# Patient Record
Sex: FEMALE | Race: OTHER | Employment: PART TIME | URBAN - METROPOLITAN AREA
[De-identification: names, ages, dates, MRNs, and addresses within clinical notes are randomized per-mention and may not be internally consistent; named-entity substitution may affect disease eponyms.]

---

## 2024-09-15 PROBLEM — G93.2 IIH (IDIOPATHIC INTRACRANIAL HYPERTENSION): Status: ACTIVE | Noted: 2024-09-15

## 2024-09-17 NOTE — NURSING NOTE
Called patient to complete consult and go over instructions, patient is scheduled on  9/27/24  for diagnostic lumbar puncture. Verified allergies and no current anticoagulant medication present. Diet, medication instructions (taking own meds prior to test), also went over with patient that as of today with hold any ASA or ASA products till the date of the procedure and need for  was explained. Also went over instructions of location, time and date of procedure, of location and time ambulatory procedure unit. Also reviewed the procedure itself and answered any questions. Explained also post recovery instructions. Patient made aware that she may call if any other questions that may arise to the myself, gave them my contact information.   Information was also sent via e-mail to verified address and via epic my chart, see message below.  Upcoming Radiology appointment at [facility]  Good afternoon Ms. Mccauley,     You are scheduled on 9/27/24 @ 9 am  for diagnostic lumbar puncture.      You are to come @ 7:30 am to [facility] at [address]. Present yourself to Admission services that is located on the Ground floor to the left of the information desk in Building B. Please sign in using the Kiosk (if any issues with your registration, an admission personnel will assist you). Once admitted you will be instructed to go up to the 1st floor - Surgical services (it will be to the left at the main corridor on the 1st floor); they will get you changed for your procedure, update medical information, and draw blood work. A platelet and clotting time are needed the day of the procedure to assure your safety and healing post procedure.     You may eat a light non-greasy breakfast/lunch, drink fluids and take all your medications that are prescribed to you. Please do not take any Aspirin and also be cautious of any over the counter medication please check if Aspirin is one of the  ingredients (Tylenol, Motrin and Aleve are fine to take). You will need to be off aspirin for at least 5 days to have the procedure done if you should take any aspirin.    For this study you will have local anesthetic (Lidocaine)  to the lower back, you will be awake during the study.   If you should need an anti-anxiety medication, please contact your primary doctor and request for such medication. Bring the medication with you so you can take an hour prior to the procedure.      If not on fluid restrictions, please increase your fluid intake 3 days prior to the procedure.    For the procedure you will be on your stomach, we will use an Xray to assist in accessing your cerebral spinal fluid once the area in your lower back is cleaned and you get a local anesthetic. Once the fluid has been collected, we will send them for testing per your ordering provider instructions. A Band-aid will be applied to the site and you will be assisted back onto your stretcher so you may go back to surgical services to be monitored for the allotted time usually 1 hour.     Upon discharge you will need a ride home so please bring a  for this study. If you should use Uber/Lyft you will need to have someone accompany you for your safety.      The night of or the days following you may experience soreness at your lower back and/or  headaches, these are normal and expected. Your discharge instructions will be to rest, hydrate with fluids (caffeine also helps with the headache if present) and take over the counter medication such as Tylenol, Motrin or Aleve.      Please feel free to call if any other questions or concerns should arise.   Jo Shannon RN  [facility] Radiology RN  [address] [place] [id number]  [phone] (Office)  [phone] (Fax)  [email or web address]

## 2024-09-27 ENCOUNTER — NURSE TRIAGE (OUTPATIENT)
Dept: OTHER | Facility: OTHER | Age: 29
End: 2024-09-27

## 2024-09-27 ENCOUNTER — HOSPITAL ENCOUNTER (OUTPATIENT)
Dept: RADIOLOGY | Facility: HOSPITAL | Age: 29
Discharge: HOME | End: 2024-09-27
Payer: COMMERCIAL

## 2024-09-27 VITALS
DIASTOLIC BLOOD PRESSURE: 78 MMHG | RESPIRATION RATE: 16 BRPM | HEIGHT: 64 IN | OXYGEN SATURATION: 100 % | TEMPERATURE: 97.6 F | SYSTOLIC BLOOD PRESSURE: 120 MMHG | HEART RATE: 69 BPM | BODY MASS INDEX: 35.85 KG/M2 | WEIGHT: 210 LBS

## 2024-09-27 DIAGNOSIS — G93.2 IIH (IDIOPATHIC INTRACRANIAL HYPERTENSION): ICD-10-CM

## 2024-09-27 LAB
EXT PREGNANCY TEST URINE: NEGATIVE
EXT. CONTROL: NORMAL
INR PPP: 0.94 (ref 0.85–1.19)
PLATELET # BLD AUTO: 295 THOUSANDS/UL (ref 149–390)
PMV BLD AUTO: 10 FL (ref 8.9–12.7)
PROTHROMBIN TIME: 12.9 SECONDS (ref 12.3–15)

## 2024-09-27 PROCEDURE — 81025 URINE PREGNANCY TEST: CPT

## 2024-09-27 PROCEDURE — 85610 PROTHROMBIN TIME: CPT

## 2024-09-27 PROCEDURE — 62328 DX LMBR SPI PNXR W/FLUOR/CT: CPT

## 2024-09-27 PROCEDURE — 85049 AUTOMATED PLATELET COUNT: CPT

## 2024-09-27 NOTE — TELEPHONE ENCOUNTER
Per on-call provider, patient may take Imitrex or Tylenol, but if pain is severe, may need to go to the Emergency Department for a blood patch.

## 2024-09-27 NOTE — TELEPHONE ENCOUNTER
"Regarding: post spinal tap/head pain/neck pain  ----- Message from Phyllis SAMPSON sent at 9/27/2024  7:04 PM EDT -----  Pt stated, \"I was just in for a spinal tap. I am having pain in the left side of my head. The back of my neck also hurts. I am confused on what medication I can take.\"    "

## 2024-09-27 NOTE — TELEPHONE ENCOUNTER
"Reason for Disposition  • Patient sounds very sick or weak to the triager    Answer Assessment - Initial Assessment Questions  1. LOCATION: \"Where does it hurt?\"       Left posterior skull, close to neck; throbbing pain    2. ONSET: \"When did the headache start?\" (e.g., minutes, hours, days)       Took Tylenol around 12/1pm without relief    3. PATTERN: \"Does the pain come and go, or has it been constant since it started?\"      Constant  4. SEVERITY: \"How bad is the pain?\" and \"What does it keep you from doing?\"  (e.g., Scale 1-10; mild, moderate, or severe)     Patient states she has been laying down since she got home; pain is more severe upon getting up.     5. RECURRENT SYMPTOM: \"Have you ever had headaches before?\" If Yes, ask: \"When was the last time?\" and \"What happened that time?\"       Denies  6. CAUSE: \"What do you think is causing the headache?\"      Lumbar puncture    7. MIGRAINE: \"Have you been diagnosed with migraine headaches?\" If Yes, ask: \"Is this headache similar?\"       Yes    8. HEAD INJURY: \"Has there been any recent injury to the head?\"       Denies    9. OTHER SYMPTOMS: \"Do you have any other symptoms?\" (e.g., fever, stiff neck, eye pain, sore throat, cold symptoms)      Dizziness    10. PREGNANCY: \"Is there any chance you are pregnant?\" \"When was your last menstrual period?\"        Denies; patient uses Depo    Patient has Sumitriptan and isn't sure if she can take it.    Protocols used: Headache-Adult-    "

## 2024-09-27 NOTE — DISCHARGE INSTRUCTIONS
Lumbar Puncture     WHAT YOU NEED TO KNOW:   Lumbar puncture (LP) is a procedure in which a needle is inserted in your back and into your spinal canal. This is usually done to collect cerebrospinal fluid (CSF) to check for an infection, inflammation, bleeding, or other conditions that affect the brain. CSF is a clear, protective fluid that flows around the brain and inside the spinal canal. LP may also be done to remove CSF to reduce pressure in the brain.     DISCHARGE INSTRUCTIONS:     Follow up with your healthcare provider as directed: Write down your questions so you remember to ask them during your visits.     Post-lumbar puncture headache: You may develop a headache during the first few hours after your LP that may last for several days. The headache may be mild to severe and may get worse when you sit or stand. The following may help ease a post-lumbar puncture headache:  Drink plenty of liquids: You should drink more liquid than usual after your LP. Ask how much liquid is right for you. Caffeine may be used to treat a headache. Drinks, such as coffee, tea, or some sodas, have caffeine. Ask a Do not drink alcohol.    Lie down: If you have a headache after your lumbar puncture, it may be helpful to lie down and rest.  You may have a slight soreness over the LP area. This is normal.  Remove the band aid or dressing in 24 hours.    Contact Radiology immediately if any of the following occur:  You have a severe headache that does not get better after you lie down.  Persistent nausea or vomiting   You have a fever.   You have a stiff neck or have trouble thinking clearly.   Your legs, feet, or other parts below the waist feel numb, tingly, or weak.   You have bleeding or a discharge coming from the area where the needle was put into your back.   You have severe pain in your back or neck.    If Radiology is not available please go the nearest Emergency room for evaluation.     [facility] Radiology Contact  Information      [phone]  [facility]- [phone]  [facility]- [phone]  [facility]- [phone]   [facility]- [phone]  [facility]- [phone]  [facility]- [phone]  [facility]- [phone]

## 2024-09-28 ENCOUNTER — HOSPITAL ENCOUNTER (EMERGENCY)
Facility: HOSPITAL | Age: 29
Discharge: HOME | End: 2024-09-28
Attending: EMERGENCY MEDICINE
Payer: COMMERCIAL

## 2024-09-28 ENCOUNTER — NURSE TRIAGE (OUTPATIENT)
Dept: OTHER | Facility: OTHER | Age: 29
End: 2024-09-28

## 2024-09-28 ENCOUNTER — APPOINTMENT (EMERGENCY)
Dept: CT IMAGING | Facility: HOSPITAL | Age: 29
End: 2024-09-28
Payer: COMMERCIAL

## 2024-09-28 VITALS
WEIGHT: 212.74 LBS | DIASTOLIC BLOOD PRESSURE: 69 MMHG | BODY MASS INDEX: 36.52 KG/M2 | SYSTOLIC BLOOD PRESSURE: 105 MMHG | HEART RATE: 86 BPM | RESPIRATION RATE: 18 BRPM | OXYGEN SATURATION: 100 % | TEMPERATURE: 97.6 F

## 2024-09-28 DIAGNOSIS — G97.1 POST LUMBAR PUNCTURE HEADACHE: Primary | ICD-10-CM

## 2024-09-28 PROCEDURE — 70450 CT HEAD/BRAIN W/O DYE: CPT

## 2024-09-28 PROCEDURE — 99283 EMERGENCY DEPT VISIT LOW MDM: CPT

## 2024-09-28 PROCEDURE — 96375 TX/PRO/DX INJ NEW DRUG ADDON: CPT

## 2024-09-28 PROCEDURE — 96366 THER/PROPH/DIAG IV INF ADDON: CPT

## 2024-09-28 PROCEDURE — 96365 THER/PROPH/DIAG IV INF INIT: CPT

## 2024-09-28 PROCEDURE — 99284 EMERGENCY DEPT VISIT MOD MDM: CPT | Performed by: EMERGENCY MEDICINE

## 2024-09-28 RX ORDER — KETOROLAC TROMETHAMINE 30 MG/ML
15 INJECTION, SOLUTION INTRAMUSCULAR; INTRAVENOUS ONCE
Status: COMPLETED | OUTPATIENT
Start: 2024-09-28 | End: 2024-09-28

## 2024-09-28 RX ORDER — METOCLOPRAMIDE HYDROCHLORIDE 5 MG/ML
10 INJECTION INTRAMUSCULAR; INTRAVENOUS ONCE
Status: COMPLETED | OUTPATIENT
Start: 2024-09-28 | End: 2024-09-28

## 2024-09-28 RX ORDER — DIPHENHYDRAMINE HYDROCHLORIDE 50 MG/ML
25 INJECTION INTRAMUSCULAR; INTRAVENOUS ONCE
Status: COMPLETED | OUTPATIENT
Start: 2024-09-28 | End: 2024-09-28

## 2024-09-28 RX ORDER — MAGNESIUM SULFATE HEPTAHYDRATE 40 MG/ML
2 INJECTION, SOLUTION INTRAVENOUS ONCE
Status: COMPLETED | OUTPATIENT
Start: 2024-09-28 | End: 2024-09-28

## 2024-09-28 RX ADMIN — METOCLOPRAMIDE 10 MG: 5 INJECTION, SOLUTION INTRAMUSCULAR; INTRAVENOUS at 16:32

## 2024-09-28 RX ADMIN — MAGNESIUM SULFATE HEPTAHYDRATE 2 G: 40 INJECTION, SOLUTION INTRAVENOUS at 16:46

## 2024-09-28 RX ADMIN — KETOROLAC TROMETHAMINE 15 MG: 30 INJECTION, SOLUTION INTRAMUSCULAR at 16:32

## 2024-09-28 RX ADMIN — DIPHENHYDRAMINE HYDROCHLORIDE 25 MG: 50 INJECTION, SOLUTION INTRAMUSCULAR; INTRAVENOUS at 16:33

## 2024-09-28 RX ADMIN — CAFFEINE AND SODIUM BENZOATE 500 MG: 125 INJECTION, SOLUTION INTRAMUSCULAR; INTRAVENOUS at 17:54

## 2024-09-28 RX ADMIN — SODIUM CHLORIDE 1000 ML: 0.9 INJECTION, SOLUTION INTRAVENOUS at 16:32

## 2024-09-28 NOTE — TELEPHONE ENCOUNTER
"Reason for Disposition  • Stiff neck (can't touch chin to chest)    Answer Assessment - Initial Assessment Questions  1. LOCATION: \"Where does it hurt?\"       Whole head       But feels pressure on the left side to her spine    2. ONSET: \"When did the headache start?\" (e.g., minutes, hours, days)       Yesterday after spinal tap     3. PATTERN: \"Does the pain come and go, or has it been constant since it started?\"      Constant since it started this AM     4. SEVERITY: \"How bad is the pain?\" and \"What does it keep you from doing?\"  (e.g., Scale 1-10; mild, moderate, or severe)      10/10    5. RECURRENT SYMPTOM: \"Have you ever had headaches before?\" If Yes, ask: \"When was the last time?\" and \"What happened that time?\"       Yes but this doesn't feel like normal headache/migraine     6. CAUSE: \"What do you think is causing the headache?\"      Recently had spinal tap done- 9/27    7. MIGRAINE: \"Have you been diagnosed with migraine headaches?\" If Yes, ask: \"Is this headache similar?\"       Yes       Takes Imitrex for migraines- yesterday it worked but today not working     8. HEAD INJURY: \"Has there been any recent injury to the head?\"       Denies     9. OTHER SYMPTOMS: \"Do you have any other symptoms?\" (e.g., fever, stiff neck, eye pain, sore throat, cold symptoms)      Blurry vision       Dizziness with standing up       Nausea       Neck stiffness, has trouble moving her neck     10. PREGNANCY: \"Is there any chance you are pregnant?\" \"When was your last menstrual period?\"        Denies    Protocols used: Headache-Adult-AH    "

## 2024-09-28 NOTE — TELEPHONE ENCOUNTER
Patient calling in with a worsening position headache. Stated she had a headache yesterday that did improve after taking her prescribed imitrex but today the medication is not helping. Stated she has now also developed dizziness, blurry vision, nausea and neck stiffness. Patient's current pain level is a 10/10. Informed the patient that she should proceed to the nearest ED at this time for evaluation per provider's recommendation yesterday. Patient verbalized understanding, stated she is able to get a ride to the [facility] now. On call provider made aware.

## 2024-09-28 NOTE — TELEPHONE ENCOUNTER
"Regarding: spinal tap/headache wont go away/blurry vision  ----- Message from Gemma SAMPSON sent at 9/28/2024  2:08 PM EDT -----  \"I had a spinal tap done and I am getting a headache that wont go away. I am also experiencing blurry vision\"    "

## 2024-09-29 NOTE — ED PROVIDER NOTES
Final diagnoses:   Post lumbar puncture headache     ED Disposition       ED Disposition   Discharge    Condition   Stable    Date/Time   Sat Sep 28, 2024  8:45 PM    Comment   Caprice Salomon discharge to home/self care.                   Assessment & Plan       Medical Decision Making  29-year-old female with closed LP headache will give migraine cocktail including caffeine, check CT head, reassess.    Amount and/or Complexity of Data Reviewed  Radiology: ordered.    Risk  Prescription drug management.             Medications   sodium chloride 0.9 % bolus 1,000 mL (0 mL Intravenous Stopped 9/28/24 2049)   metoclopramide (REGLAN) injection 10 mg (10 mg Intravenous Given 9/28/24 1632)   diphenhydrAMINE (BENADRYL) injection 25 mg (25 mg Intravenous Given 9/28/24 1633)   ketorolac (TORADOL) injection 15 mg (15 mg Intravenous Given 9/28/24 1632)   magnesium sulfate 2 g/50 mL IVPB (premix) 2 g (0 g Intravenous Stopped 9/28/24 2050)   caffeine-sodium benzoate 500 mg in sodium chloride 0.9 % 1,000 mL IVPB (0 mg Intravenous Stopped 9/28/24 2049)       ED Risk Strat Scores                           SBIRT 22yo+      Flowsheet Row Most Recent Value   Initial Alcohol Screen: US AUDIT-C     1. How often do you have a drink containing alcohol? 0 Filed at: 09/28/2024 1515   2. How many drinks containing alcohol do you have on a typical day you are drinking?  0 Filed at: 09/28/2024 1515   3a. Male UNDER 65: How often do you have five or more drinks on one occasion? 0 Filed at: 09/28/2024 1515   3b. FEMALE Any Age, or MALE 65+: How often do you have 4 or more drinks on one occassion? 0 Filed at: 09/28/2024 1515   Audit-C Score 0 Filed at: 09/28/2024 1515   RACQUEL: How many times in the past year have you...    Used an illegal drug or used a prescription medication for non-medical reasons? Never Filed at: 09/28/2024 1515                            History of Present Illness       Chief Complaint   Patient presents with    Post-op Problem      Pt had spinal tap yesterday for worsening migraines, pt reports headache since that has worsened today with blurred vision. Took rescue migraine meds w/o relief.        Past Medical History:   Diagnosis Date    GERD (gastroesophageal reflux disease)     Left knee pain     arthroscopy today 10/3/2023    Migraine     Migraines     Wears glasses       Past Surgical History:   Procedure Laterality Date    FL LUMBAR PUNCTURE DIAGNOSTIC  9/27/2024    OVARIAN CYST REMOVAL      HI ARTHROSCOPY KNEE REMOVAL LOOSE/FOREIGN BODY Left 10/3/2023    Procedure: ARTHROSCOPY KNEE, loose body removal;  Surgeon: Zeus Harrell MD;  Location: [facility] OR;  Service: Orthopedics    TUBAL LIGATION        Family History   Problem Relation Age of Onset    Diabetes Father     Leukemia Brother     Diabetes Maternal Grandmother     Cancer Maternal Grandfather     Lung cancer Maternal Grandfather     Breast cancer Maternal Aunt       Social History     Tobacco Use    Smoking status: Never    Smokeless tobacco: Never   Vaping Use    Vaping status: Never Used   Substance Use Topics    Alcohol use: No    Drug use: No      E-Cigarette/Vaping    E-Cigarette Use Never User       E-Cigarette/Vaping Substances    Nicotine No     THC No     CBD No     Flavoring No     Other No     Unknown No       I have reviewed and agree with the history as documented.     21-year-old female presents emergency department for evaluation of headache.  Patient recently had LP for treatment of IIH, since then has had exacerbation of her headache which she describes as overall pressure, with worse when she changes position associated with some pressure behind the eyes and blurry vision, she has had blurry vision from previous headaches, she has had no fevers or chills no neck pain or stiffness she has had no numbness weakness tingling         Review of Systems   Constitutional:  Negative for appetite change, chills, fatigue and fever.   HENT:  Negative for sneezing  and sore throat.    Eyes:  Negative for visual disturbance.   Respiratory:  Negative for cough, choking, chest tightness, shortness of breath and wheezing.    Cardiovascular:  Negative for chest pain and palpitations.   Gastrointestinal:  Negative for abdominal pain, constipation, diarrhea, nausea and vomiting.   Genitourinary:  Negative for difficulty urinating and dysuria.   Neurological:  Positive for headaches. Negative for dizziness, weakness, light-headedness and numbness.   All other systems reviewed and are negative.          Objective       ED Triage Vitals   Temperature Pulse Blood Pressure Respirations SpO2 Patient Position - Orthostatic VS   09/28/24 1513 09/28/24 1513 09/28/24 1513 09/28/24 1513 09/28/24 1513 09/28/24 1513   97.6 °F (36.4 °C) 96 121/75 16 100 % Sitting      Temp Source Heart Rate Source BP Location FiO2 (%) Pain Score    09/28/24 1513 09/28/24 1513 09/28/24 1513 -- 09/28/24 1515    Temporal Monitor Left arm  10 - Worst Possible Pain      Vitals      Date and Time Temp Pulse SpO2 Resp BP Pain Score FACES Pain Rating User   09/28/24 2050 -- 86 100 % 18 105/69 -- -- FS   09/28/24 1515 -- -- -- -- -- 10 - Worst Possible Pain -- VB   09/28/24 1513 97.6 °F (36.4 °C) 96 100 % 16 121/75 -- -- TOD            Physical Exam  Vitals and nursing note reviewed.   Constitutional:       General: She is not in acute distress.     Appearance: She is well-developed. She is not diaphoretic.   HENT:      Head: Normocephalic and atraumatic.   Eyes:      Pupils: Pupils are equal, round, and reactive to light.   Neck:      Vascular: No JVD.      Trachea: No tracheal deviation.   Cardiovascular:      Rate and Rhythm: Normal rate and regular rhythm.      Heart sounds: Normal heart sounds. No murmur heard.     No friction rub. No gallop.   Pulmonary:      Effort: Pulmonary effort is normal. No respiratory distress.      Breath sounds: Normal breath sounds. No wheezing or rales.   Abdominal:      General: Bowel  sounds are normal. There is no distension.      Palpations: Abdomen is soft.      Tenderness: There is no abdominal tenderness. There is no guarding or rebound.   Skin:     General: Skin is warm and dry.      Coloration: Skin is not pale.   Neurological:      Mental Status: She is alert and oriented to person, place, and time.      Cranial Nerves: No cranial nerve deficit.      Motor: No abnormal muscle tone.   Psychiatric:         Behavior: Behavior normal.         Results Reviewed       None            CT head without contrast   Final Interpretation by Anjel Taylor MD (09/28 1746)      No acute intracranial hemorrhage, mass effect or midline shift.                  Workstation performed: [id number]             Procedures    ED Medication and Procedure Management   Prior to Admission Medications   Prescriptions Last Dose Informant Patient Reported? Taking?   Magnesium Gluconate (MAGNESIUM 27 PO)   Yes No   Sig: Take by mouth   SUMAtriptan (IMITREX) 50 mg tablet   No No   Sig: Take 1 tablet (50 mg total) by mouth if needed for migraine May repeat 1 tablet in 2 hours if first dose not effective. No more than 3 times per week.   acetaminophen (TYLENOL) 500 mg tablet   No No   Sig: Take 2 tablets (1,000 mg total) by mouth every 8 (eight) hours   medroxyPROGESTERone (DEPO-PROVERA) 150 mg/mL injection   Yes No   Sig: Inject 150 mg into a muscle every 3 (three) months   meloxicam (MOBIC) 7.5 mg tablet   Yes No   Sig: Take 7.5 mg by mouth   rimegepant sulfate (Nurtec) 75 mg TBDP   No No   Sig: Take 1 tablet (75 mg total) by mouth every other day      Facility-Administered Medications: None     Discharge Medication List as of 9/28/2024  8:46 PM        CONTINUE these medications which have NOT CHANGED    Details   acetaminophen (TYLENOL) 500 mg tablet Take 2 tablets (1,000 mg total) by mouth every 8 (eight) hours, Starting Thu 8/24/2023, Normal      Magnesium Gluconate (MAGNESIUM 27 PO) Take by mouth, Historical Med       medroxyPROGESTERone (DEPO-PROVERA) 150 mg/mL injection Inject 150 mg into a muscle every 3 (three) months, Historical Med      meloxicam (MOBIC) 7.5 mg tablet Take 7.5 mg by mouth, Starting Fri 11/3/2023, Historical Med      rimegepant sulfate (Nurtec) 75 mg TBDP Take 1 tablet (75 mg total) by mouth every other day, Starting Mon 9/16/2024, Normal      SUMAtriptan (IMITREX) 50 mg tablet Take 1 tablet (50 mg total) by mouth if needed for migraine May repeat 1 tablet in 2 hours if first dose not effective. No more than 3 times per week., Starting Tue 7/2/2024, Normal           No discharge procedures on file.  ED SEPSIS DOCUMENTATION   Time reflects when diagnosis was documented in both MDM as applicable and the Disposition within this note       Time User Action Codes Description Comment    9/28/2024  8:45 PM Armando Puckett Add [G97.1] Post lumbar puncture headache                  Armando Puckett MD  09/29/24 0334

## 2024-09-30 ENCOUNTER — NURSE TRIAGE (OUTPATIENT)
Age: 29
End: 2024-09-30

## 2024-09-30 DIAGNOSIS — G97.1 POST LUMBAR PUNCTURE HEADACHE: Primary | ICD-10-CM

## 2024-09-30 NOTE — TELEPHONE ENCOUNTER
I would recommend conservative treatment with pushing fluids (water, Gatorade, Pedialyte etc.) 60-80 oz  Also consider adding caffeine which may help with headache. Can consider caffeinated beverages such as coffee vs soda. Another option is  Excedrin migraine which contains caffeine.   Would recommend trying to lay flat as much as possible.   If no improvement despite doing this today, then would recommend blood patch.

## 2024-09-30 NOTE — TELEPHONE ENCOUNTER
JENNI Alcantara      I would recommend conservative treatment with pushing fluids (water, Gatorade, Pedialyte etc.) 60-80 oz  Also consider adding caffeine which may help with headache. Can consider caffeinated beverages such as coffee vs soda. Another option is  Excedrin migraine which contains caffeine.   Would recommend trying to lay flat as much as possible.   If no improvement despite doing this today, then would recommend blood patch.         Perla ARTEAGA also advised via Epic Secure Chat msg, that She can let us know and we can try to arrange outpatient  Blood Patch, to avoid the ER     _________________________________________    I spoke to patient and read her Perla's JENNI recommendations above. Pt understood and aware to provide update to our office tomorrow. Prior to ending call and asking if she had any other questions, the phone disconnected. I called patient back and left a detailed msg per recommendations above and informed patient if she has any further questions to call our office back.

## 2024-09-30 NOTE — TELEPHONE ENCOUNTER
ED provider told patient on Saturday 9/28/24, he did not feel comfortable doing a blood patch, due to her blurry vision (which pt typically gets with her migraines). Patient informed she does get blurry vision when she goes to stand up and move, which is typical with her migraines and not new since LP. Patient wants recommendation from provider as pt was seen at [facility] for initiall LP and had a bad experience. Patient tried all ED provider's recommendation since being d/c, tried her current migraine meds, drank caffeine and lots of water and nothing has helped so far.  Patient informed she has kids to take care of and her  has needed to help her more and will be calling PCP to see if they can give him a work note, to miss a few days to help care for his wife and kids during this time, as patient is limited and unsure what our provider will advise next due to her symptoms.    Patient does not want a blood patch/go to ED,  as she is concerned as she had a bad experience with LP, unless provider advises that is the only way to correct her migraine and needs to go to ED. Patient requesting if provider can advise if there is any other medications or other options to treat this LP headache or if Blood patch is the only way to correct it? If provider advises blood patch, pt would like to know which location she should go to as she is nervous to have to go back to \[facility]\"" . I did listen to patient and to her concerns, and advised that I would advise her to go to ED per triage symptoms. However, I hear and understand her, that she wants provider to advise before she does anything else.     I Routed high priority to provider and also sent an EPIC secure chat to provider as high priority to please advise recommendations per pt request, due to her recent LP on 9/27/24 and ER experience on Saturday 9/28/24. Patient aware once we receive update from provider we will give her a call back at , may  "leave a detailed msg per communication consent.       Reason for Disposition   SEVERE headache, states 'worst headache' of life     When she goes to stand or doing things, the migraine intensifys, if laying down her migraine is a 1/10. Due to LP headache. Patient will not go to ED unless provider advises and  provides recommendations.    Answer Assessment - Initial Assessment Questions  1. LOCATION: \"Where does it hurt?\"       Left side lower portion of the occipital area of the head and rushes to the entire area of the head  2. ONSET: \"When did the headache start?\" (Minutes, hours or days)       Started right afterwards of the LP she had on 9/27/24. Patient had intial headache immediately from turning onto the back and had headache, it already started right after LP. She had headache prior to sitting up. They advised pt they could not give any medications during onset of headache after LP. They told patient to go home and take Tylenol. Patient advised when she called into office that night, they advised her to take migraine med that night and it worked. BUt then she woke up in am, the headache came back. During that time she got intense pressure.She informed pressure got so intense she did go back to ED 9/28/24. That is when she was given the migraine cocktail, CT head and caffeine and mag at hospital. Patient has been sleeping a lot more since Sat after being D/c. She was only in ED for 6 hours. Patient informed because she was lying down for ED stay, her headache wasn't there any longer so she went home. But right when she was standing and started walking, when her headache came back but when she was almost home, her headache was intense. But since the ED provider told her to take caffeine, drink lots of water and rest, that is what she did since d/c. But the headache since then has not gone away. Patient has to lay flat on back to get any slight relief. But if she moves to the side, or try to sit up, she has " "intense pressure and rush of headache.  3. PATTERN: \"Does the pain come and go, or has it been constant since it started?\"      Constant since LP 9/27.  4. SEVERITY: \"How bad is the pain?\" and \"What does it keep you from doing?\"  (e.g., Scale 1-10; mild, moderate, or severe)    - MILD (1-3): doesn't interfere with normal activities     - MODERATE (4-7): interferes with normal activities or awakens from sleep     - SEVERE (8-10): excruciating pain, unable to do any normal activities         If laying down flat 1/10. If going to the side 3-4/10, but sitting up or go to bathroom, or take care of kids and has to get up slowly, and the intense rush of pain goes right to a 10/10. Worst migraine of her life, if she goes to sit up or stand up to do anything.   5. RECURRENT SYMPTOM: \"Have you ever had headaches before?\" If Yes, ask: \"When was the last time?\" and \"What happened that time?\"       Yes, had migraine in June/July kept patient out of work for 2 weeks due to vomiting. But this headache is much more intense due to moving it goes to a 10.10 with throbbing pain  6. CAUSE: \"What do you think is causing the headache?\"      LP Headache  caused from LP being done 9/27/24  7. MIGRAINE: \"Have you been diagnosed with migraine headaches?\" If Yes, ask: \"Is this headache similar?\"       Yes , diff as this headache is much more intense when needing to move or sit or standing up  8. HEAD INJURY: \"Has there been any recent injury to the head?\"       No  9. OTHER SYMPTOMS: \"Do you have any other symptoms?\" (fever, stiff neck, eye pain, sore throat, cold symptoms)      Eye pain from pressure since LP. Stiff neck as her typical migraine head that occurs lower portion of back of her head and then radiates to entire head, but this migraine is much worse and intense since LP. Patient is able to put chin towards chest without any difficulty. Denies Sore throat or cold symptoms. When moving eyes side to side there is so much pressure in " "her eye are, like if her eyes want to come out.  10. PREGNANCY: \"Is there any chance you are pregnant?\" \"When was your last menstrual period?\"        Denies pregnancy,. LMP 6 months ago, pt is on depo shot.    Protocols used: Headache-ADULT-[place]    "

## 2024-10-02 NOTE — TELEPHONE ENCOUNTER
Spoke w/pt. She does admit to some improvement. She is no longer in excruciating pain. She is following all recommendations. Takes Excedrin Migraine q8 hrs. It typically wears off after 6-7 hrs.  Pt is able to sit-up/stand/walk with medicine. She was able to drive daughter to school yesterday; however, was in considerable pain when she got home from sitting in car for extended period of time. She keeps legs elevated and is wearing compression socks and uses leg compression machine q5hrs or so.   She admits to pain/pressure w/eye movement. States this was present prior to LP but has increased s/p LP. The pain does resolve when taking Excedrin Migraine. Denies any blurry vision.    No issues w/bowel or bladder.    Pt would like to see how she is Friday since she is noticing improvement in symptoms.     Luis Carlos riggins

## 2024-10-04 NOTE — TELEPHONE ENCOUNTER
Outbound call placed to patient.    Patient stated that she has been laying down all day and stated that headaches aren't as bad. Just worsen if pt coughs, laughs, or gets up.     Pt stated that she will wait to see how she feels tomorrow. Stated if symptoms continue, she will go to the ED. Pt aware if she does intend to go to the ED, to hold off on taking the Excedrin due to the ASA. Pt verbalized understanding and had no further questions at this time.    Jonathon ALCALA    The history is provided by the patient and medical records. 38 male present emergency department with abdominal pain. Patient states been going on for 1 week. He states that location abdominal pain is epigastric and no worsening with any foods he states. Been intermittent in nature. Does have associated diarrhea. As well as having associated nausea and vomiting. He still having the vomiting and diarrhea as well. He states that it was recently more concerning to him because he had 2 episodes of bright red blood in his stool. Describes there is bright red blood in the toilet as well as when he wiped. He states he thinks he has a history of hemorrhoids in the past.  He otherwise denies any other acute complaints. Denies any changes to urinary habits. Denies any chest pain shortness of breath fevers chills, just redness. The patient presents with abd apin that has been going on for 1 week. These symptoms are moderate in severity. Symptoms are made better by nothign. Symptoms are made worse by nothing. Associated symptoms include nausea, vomiting diarrhea. Review of Systems   Constitutional: Negative for chills and fever. HENT: Negative for ear pain, sinus pressure and sore throat. Eyes: Negative for pain, discharge and redness. Respiratory: Negative for cough, shortness of breath and wheezing. Cardiovascular: Negative for chest pain. Gastrointestinal: Positive for abdominal pain, diarrhea, nausea and vomiting. Genitourinary: Negative for dysuria and frequency. Musculoskeletal: Negative for arthralgias and back pain. Skin: Negative for rash and wound. Neurological: Negative for weakness and headaches. Hematological: Negative for adenopathy. All other systems reviewed and are negative. Physical Exam  Vitals and nursing note reviewed. Constitutional:       General: He is not in acute distress. Appearance: Normal appearance. He is well-developed.  He is not toxic-appearing. HENT:      Head: Normocephalic and atraumatic. Eyes:      General: No scleral icterus. Conjunctiva/sclera: Conjunctivae normal.   Cardiovascular:      Rate and Rhythm: Normal rate and regular rhythm. Heart sounds: Normal heart sounds. No murmur heard. Pulmonary:      Effort: Pulmonary effort is normal. No respiratory distress. Breath sounds: Normal breath sounds. No wheezing or rales. Abdominal:      General: Bowel sounds are normal.      Palpations: Abdomen is soft. Tenderness: There is abdominal tenderness in the epigastric area. There is no guarding or rebound. Comments: no guarding, soft, nonsurgical no abdominal scars noted   Musculoskeletal:         General: No swelling, tenderness or deformity. Cervical back: Normal range of motion and neck supple. Skin:     General: Skin is warm and dry. Coloration: Skin is not jaundiced. Neurological:      General: No focal deficit present. Mental Status: He is alert and oriented to person, place, and time. Psychiatric:         Thought Content: Thought content normal.          Procedures     MDM   27-year-old male presents emerged part complaint of abdominal pain. Patient's abdomen is soft, minimally tender to palpation nonsurgical nature. No signs of trauma or injury. His laboratory work-up was complete unremarkable for any acute pathology. EKG showed no acute ST elevation depression, troponin within normal limits. He was given multiple medications for his abdominal pain. As well as a liter of fluid. Patient due to his unremarkable exam and work-up is safe to be discharged home at this time. Patient safe for discharge from the emergency department. Did advise on return precautions to the emergency department. Did also advise follow-up with her primary care physician. Patient agreeable with the plan moving forward. EKG: This EKG is signed by emergency department physician.     Rate: 67  Rhythm: Sinus  Interpretation: No acute ST elevation depression normal sinus rhythm normal axis  Comparison: No previous EKG to compare to             --------------------------------------------- PAST HISTORY ---------------------------------------------  Past Medical History:  has no past medical history on file. Past Surgical History:  has a past surgical history that includes Ankle surgery (Right, 10/13/2016); other surgical history (Right, 11/17/2016); and Foot surgery (11/29/2017). Social History:  reports that he has been smoking cigarettes. He has a 25.00 pack-year smoking history. He has never used smokeless tobacco. He reports current alcohol use. He reports current drug use. Drug: Marijuana Alley Amble). Family History: family history is not on file. The patients home medications have been reviewed. Allergies: Patient has no known allergies.     -------------------------------------------------- RESULTS -------------------------------------------------  Labs:  Results for orders placed or performed during the hospital encounter of 12/03/21   CBC Auto Differential   Result Value Ref Range    WBC 6.3 4.5 - 11.5 E9/L    RBC 5.03 3.80 - 5.80 E12/L    Hemoglobin 15.9 12.5 - 16.5 g/dL    Hematocrit 46.0 37.0 - 54.0 %    MCV 91.5 80.0 - 99.9 fL    MCH 31.6 26.0 - 35.0 pg    MCHC 34.6 (H) 32.0 - 34.5 %    RDW 12.8 11.5 - 15.0 fL    Platelets 097 025 - 592 E9/L    MPV 9.5 7.0 - 12.0 fL    Neutrophils % 59.1 43.0 - 80.0 %    Immature Granulocytes % 0.2 0.0 - 5.0 %    Lymphocytes % 26.9 20.0 - 42.0 %    Monocytes % 9.9 2.0 - 12.0 %    Eosinophils % 3.3 0.0 - 6.0 %    Basophils % 0.6 0.0 - 2.0 %    Neutrophils Absolute 3.71 1.80 - 7.30 E9/L    Immature Granulocytes # 0.01 E9/L    Lymphocytes Absolute 1.69 1.50 - 4.00 E9/L    Monocytes Absolute 0.62 0.10 - 0.95 E9/L    Eosinophils Absolute 0.21 0.05 - 0.50 E9/L    Basophils Absolute 0.04 0.00 - 0.20 K0/S   Basic Metabolic Panel w/ Reflex to MG   Result Value Ref Range    Sodium 139 132 - 146 mmol/L    Potassium reflex Magnesium 4.0 3.5 - 5.0 mmol/L    Chloride 103 98 - 107 mmol/L    CO2 26 22 - 29 mmol/L    Anion Gap 10 7 - 16 mmol/L    Glucose 102 (H) 74 - 99 mg/dL    BUN 10 6 - 20 mg/dL    CREATININE 0.9 0.7 - 1.2 mg/dL    GFR Non-African American >60 >=60 mL/min/1.73    GFR African American >60     Calcium 9.1 8.6 - 10.2 mg/dL   Hepatic Function Panel   Result Value Ref Range    Total Protein 6.7 6.4 - 8.3 g/dL    Albumin 4.3 3.5 - 5.2 g/dL    Alkaline Phosphatase 107 40 - 129 U/L    ALT 23 0 - 40 U/L    AST 16 0 - 39 U/L    Total Bilirubin 0.7 0.0 - 1.2 mg/dL    Bilirubin, Direct <0.2 0.0 - 0.3 mg/dL    Bilirubin, Indirect see below 0.0 - 1.0 mg/dL   Lipase   Result Value Ref Range    Lipase 20 13 - 60 U/L   Troponin   Result Value Ref Range    Troponin, High Sensitivity <6 0 - 11 ng/L   Brain Natriuretic Peptide   Result Value Ref Range    Pro-BNP 17 0 - 125 pg/mL   Lactic Acid, Plasma   Result Value Ref Range    Lactic Acid 0.9 0.5 - 2.2 mmol/L   EKG 12 Lead   Result Value Ref Range    Ventricular Rate 67 BPM    Atrial Rate 67 BPM    P-R Interval 132 ms    QRS Duration 86 ms    Q-T Interval 412 ms    QTc Calculation (Bazett) 435 ms    P Axis 16 degrees    R Axis 64 degrees    T Axis 27 degrees       Radiology:  XR CHEST PORTABLE   Final Result   No acute process. ------------------------- NURSING NOTES AND VITALS REVIEWED ---------------------------  Date / Time Roomed:  12/3/2021  6:57 AM  ED Bed Assignment:  92/01    The nursing notes within the ED encounter and vital signs as below have been reviewed.    BP (!) 120/93   Pulse 93   Temp 97.8 °F (36.6 °C) (Oral)   Resp 17   Ht 5' 9\" (1.753 m)   Wt 175 lb (79.4 kg)   SpO2 98%   BMI 25.84 kg/m²   Oxygen Saturation Interpretation: Normal      ------------------------------------------ PROGRESS NOTES ------------------------------------------  10:13 AM EST  I have spoken with the patient and discussed todays results, in addition to providing specific details for the plan of care and counseling regarding the diagnosis and prognosis. Their questions are answered at this time and they are agreeable with the plan. I discussed at length with them reasons for immediate return here for re evaluation. They will followup with their primary care physician by calling their office on Monday.      --------------------------------- ADDITIONAL PROVIDER NOTES ---------------------------------  At this time the patient is without objective evidence of an acute process requiring hospitalization or inpatient management. They have remained hemodynamically stable throughout their entire ED visit and are stable for discharge with outpatient follow-up. The plan has been discussed in detail and they are aware of the specific conditions for emergent return, as well as the importance of follow-up. New Prescriptions    FAMOTIDINE (PEPCID) 20 MG TABLET    Take 1 tablet by mouth daily    ONDANSETRON (ZOFRAN-ODT) 4 MG DISINTEGRATING TABLET    Take 1 tablet by mouth 3 times daily as needed for Nausea or Vomiting       Diagnosis:  1. Abdominal pain, epigastric        Disposition:  Patient's disposition: Discharge to home  Patient's condition is stable.        Cindy Jones MD  Resident  12/03/21 [id number]

## 2024-10-04 NOTE — TELEPHONE ENCOUNTER
Inbound call received from patient.     Patient reported that she has been following all of the recommendations thus far. Stated she is taking Excedrin with caffeine Q8H but it wears off after about 6-7 hours.  Reports 6-7/10 pain in head. Pt is elevating LE and wearing compression stockings and leg compression machine.    Pt is able to sit up a little longer, only if she takes the Excedrin before hand. Stated that after 30 minutes or so, pt's head starts to pound and needs to lay back down. Stated she feels as if all of her energy is drained. Reported episodes of diaphoresis and feeling hot that switches over to having the chills.    Pt denies any fever. Denies any signs and symptoms of infection around LP site.     No issues with bowels/bladder or vision.     Since symptoms are not improving as much, patient would like to have a Blood patch done. Pt stated she will have a ride for today and is asking if it can be done at either the [facility] or [facility].    Perla: please advise.

## 2024-10-04 NOTE — TELEPHONE ENCOUNTER
[facility] and see if we can schedule patient for blood patch.  Looks like Dr. Small was the preforming physician.   I did place a STAT IR order with details of request (not sure if this is needed or not?)

## 2024-10-04 NOTE — TELEPHONE ENCOUNTER
Outbound call placed to [facility], no answer.  LMOM requesting a callback to [facility] to schedule patient for a Blood Patch.       Outbound call placed to patient.   Informed pt that Perla did place an order for patient to get a Blood Patch done and RN contacted [facility] to help with scheduling pt.     Pt aware that she will be receiving a call to schedule the Blood Patch. Pt verbalized understanding and asked if IR would be able to do the blood patch tomorrow. Stated would have to ask IR due to it being the weekend and if staff is available.     Outbound call placed to IR and spoke with Tia.    Tia stated that the referral was being sent to the doctor to review the request and then IR will contact patient.

## 2024-10-04 NOTE — TELEPHONE ENCOUNTER
Please let patient know we were informed by IR that she must go to the ER at [facility] for consideration for blood patch. If she feels her headaches are improving with time she can hold off. She should hold any further excedrin doses due to Aspirin component if she plans to go to the ER.

## 2024-10-23 ENCOUNTER — TELEPHONE (OUTPATIENT)
Age: 29
End: 2024-10-23

## 2024-10-23 ENCOUNTER — OFFICE VISIT (OUTPATIENT)
Age: 29
End: 2024-10-23
Payer: COMMERCIAL

## 2024-10-23 VITALS
DIASTOLIC BLOOD PRESSURE: 84 MMHG | BODY MASS INDEX: 37.25 KG/M2 | HEIGHT: 64 IN | WEIGHT: 218.2 LBS | OXYGEN SATURATION: 99 % | SYSTOLIC BLOOD PRESSURE: 124 MMHG | HEART RATE: 81 BPM

## 2024-10-23 DIAGNOSIS — E66.9 OBESITY: ICD-10-CM

## 2024-10-23 DIAGNOSIS — G43.709 CHRONIC MIGRAINE WITHOUT AURA WITHOUT STATUS MIGRAINOSUS, NOT INTRACTABLE: Primary | ICD-10-CM

## 2024-10-23 PROBLEM — G93.2 IIH (IDIOPATHIC INTRACRANIAL HYPERTENSION): Status: RESOLVED | Noted: 2024-09-15 | Resolved: 2024-10-23

## 2024-10-23 PROCEDURE — 99214 OFFICE O/P EST MOD 30 MIN: CPT

## 2024-10-23 RX ORDER — IBUPROFEN 800 MG/1
TABLET, FILM COATED ORAL
COMMUNITY
Start: 2024-08-21

## 2024-10-23 RX ORDER — SUMATRIPTAN 50 MG/1
50 TABLET, FILM COATED ORAL AS NEEDED
Qty: 9 TABLET | Refills: 5 | Status: SHIPPED | OUTPATIENT
Start: 2024-10-23

## 2024-10-23 NOTE — PATIENT INSTRUCTIONS
"Headache/migraine treatment:   - When you have a moderate to severe headache, you should seek rest, initiate relaxation and apply cold compresses to the head.      Abortive medications (for immediate treatment of a headache):   It is ok to take ibuprofen, acetaminophen or naproxen (Advil, Tylenol,  Aleve, Excedrin) if they help your headaches you should limit these to no more than 3 times a week to avoid medication overuse/rebound headaches.     Take Excedrin Tension headache at the onset of a migraine headache  Okay to use sumatriptan  Please contact the office if this is not effective, and we can consider an alternative Triptan     Over the counter preventive supplements for headaches/migraines   (to take every day to help prevent headaches - not to take at the time of headache):  [x] Magnesium 500mg daily (If any diarrhea or upset stomach, decrease dose  as tolerated)  [x] Riboflavin (Vitamin B2) 400mg daily (FYI B2 may make your urine bright/neon yellow)     Prescription preventive medications for headaches/migraines   (to take every day to help prevent headaches - not to take at the time of headache):    Start Emgality 240 mg (2 injections) x 1 month, then Emgality 120 mg (1 injection) every 30 days thereafter      *Typically these types of medications take time untill you see the benefit, although some may see improvement in days, often it may take weeks, especially if the medication is being titrated up to a beneficial level. Please contact us if there are any concerns or questions regarding the medication.      Self-Monitoring:  [x] Headache calendar. Each day angel a number from 0-10 indicating if there was a headache and how bad it was.  This can be used to monitor gradual improvement and is helpful to make medication adjustments. You can do this on paper or there is an JOANNE for a smart phone called \"Migraine e Diary\".      Lifestyle Recommendations:  [x] SLEEP - Maintain a regular sleep schedule: Adults " need at least 7-8 hours of uninterrupted a night. Maintain good sleep hygiene:  Going to bed and waking up at consistent times, avoiding excessive daytime naps, avoiding caffeinated beverages in the evening, avoid excessive stimulation in the evening and generally using bed primarily for sleeping.  One hour before bedtime would recommend turning lights down lower, decreasing your activity (may read quietly, listen to music at a low volume). When you get into bed, should eliminate all technology (no texting, emailing, playing with your phone, iPad or tablet in bed).  [x] HYDRATION - Maintain good hydration.  Drink  2L of fluid a day (4 typical small water bottles)  [x] DIET - Maintain good nutrition. In particular don't skip meals and try and eat healthy balanced meals regularly.  [x] TRIGGERS - Look for other triggers and avoid them: Limit caffeine to 1-2 cups a day or less. Avoid dietary triggers that you have noticed bring on your headaches (this could include aged cheese, peanuts, MSG, aspartame and nitrates).  [x] EXERCISE - physical exercise as we all know is good for you in many ways, and not only is good for your heart, but also is beneficial for your mental health, cognitive health and  chronic pain/headaches. I would encourage at the least 5 days of physical exercise weekly for at least 30 minutes.      Education and Follow-up  [x] Please call with any questions or concerns. Of course if any new concerning symptoms go to the emergency department.  [x] Follow up in 3 months, sooner if needed  [x] Referral to weight management to discuss obesity and option of GLP1 medications   [x] Okay to resume Depo

## 2024-10-23 NOTE — PROGRESS NOTES
Ambulatory Visit  Name: Caprice Salomon      : 1995      MRN: [id number]  Encounter Provider: JENNI Alcantara  Encounter Date: 10/23/2024   Encounter department: [facility]    Assessment & Plan  Chronic migraine without aura without status migrainosus, not intractable    Orders:    Galcanezumab-gnlm 120 MG/ML SOAJ; Inject 240 mg under the skin once for 1 dose For just the first month loading dose, followed by 1 injection monthly on separate prescription.    Galcanezumab-gnlm 120 MG/ML SOAJ; Inject 120 mg under the skin every 30 (thirty) days Following the first month loading dose of 2 pens.    SUMAtriptan (IMITREX) 50 mg tablet; Take 1 tablet (50 mg total) by mouth if needed for migraine May repeat 1 tablet in 2 hours if first dose not effective. No more than 3 times per week.    Obesity    Orders:    Ambulatory Referral to Weight Management; Future      Patient Instructions   Headache/migraine treatment:   - When you have a moderate to severe headache, you should seek rest, initiate relaxation and apply cold compresses to the head.      Abortive medications (for immediate treatment of a headache):   It is ok to take ibuprofen, acetaminophen or naproxen (Advil, Tylenol,  Aleve, Excedrin) if they help your headaches you should limit these to no more than 3 times a week to avoid medication overuse/rebound headaches.     Take Excedrin Tension headache at the onset of a migraine headache  Okay to use sumatriptan  Please contact the office if this is not effective, and we can consider an alternative Triptan     Over the counter preventive supplements for headaches/migraines   (to take every day to help prevent headaches - not to take at the time of headache):  [x] Magnesium 500mg daily (If any diarrhea or upset stomach, decrease dose  as tolerated)  [x] Riboflavin (Vitamin B2) 400mg daily (FYI B2 may make your urine bright/neon yellow)     Prescription preventive medications  "for headaches/migraines   (to take every day to help prevent headaches - not to take at the time of headache):    Start Emgality 240 mg (2 injections) x 1 month, then Emgality 120 mg (1 injection) every 30 days thereafter      *Typically these types of medications take time untill you see the benefit, although some may see improvement in days, often it may take weeks, especially if the medication is being titrated up to a beneficial level. Please contact us if there are any concerns or questions regarding the medication.      Self-Monitoring:  [x] Headache calendar. Each day angel a number from 0-10 indicating if there was a headache and how bad it was.  This can be used to monitor gradual improvement and is helpful to make medication adjustments. You can do this on paper or there is an JOANNE for a smart phone called \"Migraine e Diary\".      Lifestyle Recommendations:  [x] SLEEP - Maintain a regular sleep schedule: Adults need at least 7-8 hours of uninterrupted a night. Maintain good sleep hygiene:  Going to bed and waking up at consistent times, avoiding excessive daytime naps, avoiding caffeinated beverages in the evening, avoid excessive stimulation in the evening and generally using bed primarily for sleeping.  One hour before bedtime would recommend turning lights down lower, decreasing your activity (may read quietly, listen to music at a low volume). When you get into bed, should eliminate all technology (no texting, emailing, playing with your phone, iPad or tablet in bed).  [x] HYDRATION - Maintain good hydration.  Drink  2L of fluid a day (4 typical small water bottles)  [x] DIET - Maintain good nutrition. In particular don't skip meals and try and eat healthy balanced meals regularly.  [x] TRIGGERS - Look for other triggers and avoid them: Limit caffeine to 1-2 cups a day or less. Avoid dietary triggers that you have noticed bring on your headaches (this could include aged cheese, peanuts, MSG, aspartame " and nitrates).  [x] EXERCISE - physical exercise as we all know is good for you in many ways, and not only is good for your heart, but also is beneficial for your mental health, cognitive health and  chronic pain/headaches. I would encourage at the least 5 days of physical exercise weekly for at least 30 minutes.      Education and Follow-up  [x] Please call with any questions or concerns. Of course if any new concerning symptoms go to the emergency department.  [x] Follow up in 3 months, sooner if needed  [x] Referral to weight management to discuss obesity and option of GLP1 medications   [x] Okay to resume Depo          History of Present Illness     HPI Caprice Salomon is a 29 year old female who presents to the office to follow up on her headaches. In the past she was suspected to have IIH, therefore she underwent additional work up:    Lumbar puncture 9/27/24 with opening pressure of 27 cm of H2O  Ophthalmology evaluation 9/20/2024 was without any concern for papilledema    She returns today for treatment of migraine headaches now that IIH has been ruled out. Her insurance did not approve Qulipta or Nurtec, stating she must first try and fail Emgality. Since she was last seen she reports her headaches continue with no change. Acutely she did have a post LP headache but that has resolved. She is using Excedrin Tension headache which is effective. She does use Sumatriptan as needed as well, but can only take this when her  is home as it makes her very sedated. She reports frustration with her weight loss efforts despite eating less, cutting out sugar/soda, and being very physically active she cannot lose weight. Since she was last seen she has not resumed Depo as there was concern for IIH, now that this has been ruled out she would like to resume ASAP.    Prior HPI  Caprice Mccauley is a 28 year old female with past medical history of migraine, chronic knee pain, dysmenorrhea on depo provera, tubal ligation  "(), who presents to establish care for migraine headaches. She was last seen by my colleague John Reid PA-C on 2024. At that time she described a baseline daily frontal pressure type headache, as well as a more intense headache a few times a week described as \"squeezing\" in the bilateral occipital areas that can radiate to bitemporal location. Associated symptoms included: photophobia/phonophobia/vomiting/blurred vision/and bilateral ear ringing. She had not tried many migraine preventatives except daily magnesium use. She stated a recent trial of toradol/reglan/benadryl didn't help much and caused anxiety (likely reglan reaction); fioricet also not effective in the past; mobic she only uses prn. Stress can be a trigger.  At the time of her last visit she was started on Topiramate, as well as sumatriptan as needed. She states she did not tolerate Topiramate as it caused her to have bad anxiety and paranoia. She was then trialed on Diamox and this caused her blurred vision and dizziness after just 3 doses. She was then subsequently started on a low dose of Propanolol 10 mg bid. She states this caused vomiting and blurred vision. She was then started on Venlafaxine 37.5 mg 24, she states she took this for 2 weeks but noticed prior to the next dose she had shakiness and felt as if she was \"crashing\" so she stopped this.     She returns today and states she does use sumatriptan as needed but does this only very sparingly because the 2 times she used it she was excessively sedated. She states she currently still has the same frequency/severity/duration and associated symptoms as previously described. Since she was last seen she did complete further work up:     MRI Brain/MRA head and neck 2024  1.  No acute intracranial abnormality.  There is a partially empty sella as well as tortuosity of the optic nerves with increased fluid in the optic nerve sheaths.  These may be incidental findings but may also " be seen in the setting of idiopathic intracranial hypertension.  Consider ophthalmological funduscopic examination.   2.  MRA of the neck demonstrates no significant stenosis of the carotid or vertebral arteries.   3.  MRA of the head demonstrates no evidence of aneurysm, significant intracranial stenosis, large vessel cut off, or AVM.      She reports vision loss intermittently sometimes for several days up to twice monthly. She also has pressure in her head. She does see Ophthalmology 9/20/24 [facility]. She has never seen Ophthalmology prior and as such denies any prior dx of papilledema. Current BMI 37.04. She states her current weight is the heaviest she has ever been. She does note headaches started 7 years ago after gaining weight during her first pregnancy. She is on the Depo-Provera as contraception.     Review of Systems   Constitutional:  Negative for appetite change, fatigue and fever.   HENT: Negative.  Negative for hearing loss, tinnitus, trouble swallowing and voice change.    Eyes: Negative.  Negative for photophobia, pain and visual disturbance.   Respiratory: Negative.  Negative for shortness of breath.    Cardiovascular: Negative.  Negative for palpitations.   Gastrointestinal: Negative.  Negative for nausea and vomiting.   Endocrine: Negative.  Negative for cold intolerance.   Genitourinary: Negative.  Negative for dysuria, frequency and urgency.   Musculoskeletal:  Negative for back pain, gait problem, myalgias, neck pain and neck stiffness.   Skin: Negative.  Negative for rash.   Allergic/Immunologic: Negative.    Neurological:  Positive for headaches (daily since LP; 5-7 on pain scale). Negative for dizziness, tremors, seizures, syncope, facial asymmetry, speech difficulty, weakness, light-headedness and numbness.   Hematological: Negative.  Does not bruise/bleed easily.   Psychiatric/Behavioral: Negative.  Negative for confusion, hallucinations and sleep disturbance.    All other systems  "reviewed and are negative.    I have personally reviewed the MA's review of systems and made changes as necessary.    Current Outpatient Medications on File Prior to Visit   Medication Sig Dispense Refill    acetaminophen (TYLENOL) 500 mg tablet Take 2 tablets (1,000 mg total) by mouth every 8 (eight) hours 40 tablet 0    Magnesium Gluconate (MAGNESIUM 27 PO) Take by mouth      meloxicam (MOBIC) 7.5 mg tablet Take 7.5 mg by mouth      [DISCONTINUED] SUMAtriptan (IMITREX) 50 mg tablet Take 1 tablet (50 mg total) by mouth if needed for migraine May repeat 1 tablet in 2 hours if first dose not effective. No more than 3 times per week. 9 tablet 5    ibuprofen (MOTRIN) 800 mg tablet  (Patient not taking: Reported on 10/23/2024)      medroxyPROGESTERone (DEPO-PROVERA) 150 mg/mL injection Inject 150 mg into a muscle every 3 (three) months (Patient not taking: Reported on 10/23/2024)      [DISCONTINUED] rimegepant sulfate (Nurtec) 75 mg TBDP Take 1 tablet (75 mg total) by mouth every other day (Patient not taking: Reported on 10/23/2024) 16 tablet 11     No current facility-administered medications on file prior to visit.      Objective     /84 (BP Location: Right arm, Patient Position: Sitting, Cuff Size: Standard)   Pulse 81   Ht 5' 4\" (1.626 m)   Wt 99 kg (218 lb 3.2 oz)   LMP 02/08/2024 (Approximate)   SpO2 99%   BMI 37.45 kg/m²     Neurologic Exam    On neurological examination patient is alert, awake, oriented and in no distress. Speech is fluent without dysarthria or aphasia. She is able to rise easily without assistance from a seated position. Casual gait is normal including stance, stride, and arm swing.        Administrative Statements     I have spent a total time of 30 minutes in caring for this patient on the day of the visit/encounter including Diagnostic results, Prognosis, Risks and benefits of tx options, Instructions for management, Patient and family education, Importance of tx compliance, " Risk factor reductions, Impressions, Counseling / Coordination of care, Documenting in the medical record, Reviewing / ordering tests, medicine, procedures  , and Obtaining or reviewing history  .

## 2024-10-23 NOTE — PROGRESS NOTES
Ambulatory Visit  Name: Caprice Salomon      : 1995      MRN: [id number]  Encounter Provider: JENNI Alcantara  Encounter Date: 10/23/2024   Encounter department: [facility]    Assessment & Plan  Chronic migraine without aura without status migrainosus, not intractable  Caprice Salomon is a 29 year old female with chronic migraine headaches with prior concern for potential IIH. LP did not reveal an elevated opening pressure and ophthalmology exam was benign and not concerning for papilledema, ruling the diagnosis of IIH out. We discussed due to no concern for IIH she may resume Depo-Provera. She returns today to discuss treatment for her migraines. Her insurance's preferred agent is [employer] (Qulipta and Nurtec QOD were both denied prior). We discussed dosing, administration and common side effects. She is willing to trial this. She will continue to use Excedrin Tension headache as needed, with Sumatriptan as a back up. She will follow up in 12 weeks; sooner if needed.    Orders:    Galcanezumab-gnlm 120 MG/ML SOAJ; Inject 240 mg under the skin once for 1 dose For just the first month loading dose, followed by 1 injection monthly on separate prescription.    Galcanezumab-gnlm 120 MG/ML SOAJ; Inject 120 mg under the skin every 30 (thirty) days Following the first month loading dose of 2 pens.    SUMAtriptan (IMITREX) 50 mg tablet; Take 1 tablet (50 mg total) by mouth if needed for migraine May repeat 1 tablet in 2 hours if first dose not effective. No more than 3 times per week.    Obesity  BMI 37.45  Despite reportedly eating healthy and exercising she is struggling to lose weight  Interested in discussing medication assisted weight loss options  Referral to Bariatric medicine     Orders:    Ambulatory Referral to Weight Management; Future      Patient Instructions   Headache/migraine treatment:   - When you have a moderate to severe headache, you should seek rest, initiate  "relaxation and apply cold compresses to the head.      Abortive medications (for immediate treatment of a headache):   It is ok to take ibuprofen, acetaminophen or naproxen (Advil, Tylenol,  Aleve, Excedrin) if they help your headaches you should limit these to no more than 3 times a week to avoid medication overuse/rebound headaches.     Take Excedrin Tension headache at the onset of a migraine headache  Okay to use sumatriptan  Please contact the office if this is not effective, and we can consider an alternative Triptan     Over the counter preventive supplements for headaches/migraines   (to take every day to help prevent headaches - not to take at the time of headache):  [x] Magnesium 500mg daily (If any diarrhea or upset stomach, decrease dose  as tolerated)  [x] Riboflavin (Vitamin B2) 400mg daily (FYI B2 may make your urine bright/neon yellow)     Prescription preventive medications for headaches/migraines   (to take every day to help prevent headaches - not to take at the time of headache):    Start Emgality 240 mg (2 injections) x 1 month, then Emgality 120 mg (1 injection) every 30 days thereafter      *Typically these types of medications take time untill you see the benefit, although some may see improvement in days, often it may take weeks, especially if the medication is being titrated up to a beneficial level. Please contact us if there are any concerns or questions regarding the medication.      Self-Monitoring:  [x] Headache calendar. Each day angel a number from 0-10 indicating if there was a headache and how bad it was.  This can be used to monitor gradual improvement and is helpful to make medication adjustments. You can do this on paper or there is an JOANNE for a smart phone called \"Migraine e Diary\".      Lifestyle Recommendations:  [x] SLEEP - Maintain a regular sleep schedule: Adults need at least 7-8 hours of uninterrupted a night. Maintain good sleep hygiene:  Going to bed and waking up at " consistent times, avoiding excessive daytime naps, avoiding caffeinated beverages in the evening, avoid excessive stimulation in the evening and generally using bed primarily for sleeping.  One hour before bedtime would recommend turning lights down lower, decreasing your activity (may read quietly, listen to music at a low volume). When you get into bed, should eliminate all technology (no texting, emailing, playing with your phone, iPad or tablet in bed).  [x] HYDRATION - Maintain good hydration.  Drink  2L of fluid a day (4 typical small water bottles)  [x] DIET - Maintain good nutrition. In particular don't skip meals and try and eat healthy balanced meals regularly.  [x] TRIGGERS - Look for other triggers and avoid them: Limit caffeine to 1-2 cups a day or less. Avoid dietary triggers that you have noticed bring on your headaches (this could include aged cheese, peanuts, MSG, aspartame and nitrates).  [x] EXERCISE - physical exercise as we all know is good for you in many ways, and not only is good for your heart, but also is beneficial for your mental health, cognitive health and  chronic pain/headaches. I would encourage at the least 5 days of physical exercise weekly for at least 30 minutes.      Education and Follow-up  [x] Please call with any questions or concerns. Of course if any new concerning symptoms go to the emergency department.  [x] Follow up in 3 months, sooner if needed  [x] Referral to weight management to discuss obesity and option of GLP1 medications   [x] Okay to resume Depo          History of Present Illness     HPI Caprice Salomon is a 29 year old female who presents to the office to follow up on her headaches. In the past she was suspected to have IIH, therefore she underwent additional work up:    Lumbar puncture 9/27/24 with opening pressure of 27 cm of H2O  Ophthalmology evaluation 9/20/2024 was without any concern for papilledema    She returns today for treatment of migraine headaches  "now that IIH has been ruled out. Her insurance did not approve Qulipta or Nurtec, stating she must first try and fail Emgality. Since she was last seen she reports her headaches continue with no change. Acutely she did have a post LP headache but that has resolved. She is using Excedrin Tension headache which is effective. She does use Sumatriptan as needed as well, but can only take this when her  is home as it makes her very sedated. She reports frustration with her weight loss efforts despite eating less, cutting out sugar/soda, and being very physically active she cannot lose weight. Since she was last seen she has not resumed Depo as there was concern for IIH, now that this has been ruled out she would like to resume ASAP.    Prior HPI  Caprice Mccauley is a 28 year old female with past medical history of migraine, chronic knee pain, dysmenorrhea on depo provera, tubal ligation (), who presents to establish care for migraine headaches. She was last seen by my colleague John Reid PA-C on 2024. At that time she described a baseline daily frontal pressure type headache, as well as a more intense headache a few times a week described as \"squeezing\" in the bilateral occipital areas that can radiate to bitemporal location. Associated symptoms included: photophobia/phonophobia/vomiting/blurred vision/and bilateral ear ringing. She had not tried many migraine preventatives except daily magnesium use. She stated a recent trial of toradol/reglan/benadryl didn't help much and caused anxiety (likely reglan reaction); fioricet also not effective in the past; mobic she only uses prn. Stress can be a trigger.  At the time of her last visit she was started on Topiramate, as well as sumatriptan as needed. She states she did not tolerate Topiramate as it caused her to have bad anxiety and paranoia. She was then trialed on Diamox and this caused her blurred vision and dizziness after just 3 doses. She was then " "subsequently started on a low dose of Propanolol 10 mg bid. She states this caused vomiting and blurred vision. She was then started on Venlafaxine 37.5 mg 7/24/24, she states she took this for 2 weeks but noticed prior to the next dose she had shakiness and felt as if she was \"crashing\" so she stopped this.     She returns today and states she does use sumatriptan as needed but does this only very sparingly because the 2 times she used it she was excessively sedated. She states she currently still has the same frequency/severity/duration and associated symptoms as previously described. Since she was last seen she did complete further work up:     MRI Brain/MRA head and neck 9/9/2024  1.  No acute intracranial abnormality.  There is a partially empty sella as well as tortuosity of the optic nerves with increased fluid in the optic nerve sheaths.  These may be incidental findings but may also be seen in the setting of idiopathic intracranial hypertension.  Consider ophthalmological funduscopic examination.   2.  MRA of the neck demonstrates no significant stenosis of the carotid or vertebral arteries.   3.  MRA of the head demonstrates no evidence of aneurysm, significant intracranial stenosis, large vessel cut off, or AVM.      She reports vision loss intermittently sometimes for several days up to twice monthly. She also has pressure in her head. She does see Ophthalmology 9/20/24 [place]. She has never seen Ophthalmology prior and as such denies any prior dx of papilledema. Current BMI 37.04. She states her current weight is the heaviest she has ever been. She does note headaches started 7 years ago after gaining weight during her first pregnancy. She is on the Depo-Provera as contraception.     Review of Systems   Constitutional:  Negative for appetite change, fatigue and fever.   HENT: Negative.  Negative for hearing loss, tinnitus, trouble swallowing and voice change.    Eyes: Negative.  Negative for " photophobia, pain and visual disturbance.   Respiratory: Negative.  Negative for shortness of breath.    Cardiovascular: Negative.  Negative for palpitations.   Gastrointestinal: Negative.  Negative for nausea and vomiting.   Endocrine: Negative.  Negative for cold intolerance.   Genitourinary: Negative.  Negative for dysuria, frequency and urgency.   Musculoskeletal:  Negative for back pain, gait problem, myalgias, neck pain and neck stiffness.   Skin: Negative.  Negative for rash.   Allergic/Immunologic: Negative.    Neurological:  Positive for headaches (daily since LP; 5-7 on pain scale). Negative for dizziness, tremors, seizures, syncope, facial asymmetry, speech difficulty, weakness, light-headedness and numbness.   Hematological: Negative.  Does not bruise/bleed easily.   Psychiatric/Behavioral: Negative.  Negative for confusion, hallucinations and sleep disturbance.    All other systems reviewed and are negative.    I have personally reviewed the MA's review of systems and made changes as necessary.    Current Outpatient Medications on File Prior to Visit   Medication Sig Dispense Refill    acetaminophen (TYLENOL) 500 mg tablet Take 2 tablets (1,000 mg total) by mouth every 8 (eight) hours 40 tablet 0    Magnesium Gluconate (MAGNESIUM 27 PO) Take by mouth      meloxicam (MOBIC) 7.5 mg tablet Take 7.5 mg by mouth      [DISCONTINUED] SUMAtriptan (IMITREX) 50 mg tablet Take 1 tablet (50 mg total) by mouth if needed for migraine May repeat 1 tablet in 2 hours if first dose not effective. No more than 3 times per week. 9 tablet 5    ibuprofen (MOTRIN) 800 mg tablet  (Patient not taking: Reported on 10/23/2024)      medroxyPROGESTERone (DEPO-PROVERA) 150 mg/mL injection Inject 150 mg into a muscle every 3 (three) months (Patient not taking: Reported on 10/23/2024)      [DISCONTINUED] rimegepant sulfate (Nurtec) 75 mg TBDP Take 1 tablet (75 mg total) by mouth every other day (Patient not taking: Reported on  "10/23/2024) 16 tablet 11     No current facility-administered medications on file prior to visit.      Objective     /84 (BP Location: Right arm, Patient Position: Sitting, Cuff Size: Standard)   Pulse 81   Ht 5' 4\" (1.626 m)   Wt 99 kg (218 lb 3.2 oz)   LMP 02/08/2024 (Approximate)   SpO2 99%   BMI 37.45 kg/m²     Neurologic Exam    On neurological examination patient is alert, awake, oriented and in no distress. Speech is fluent without dysarthria or aphasia. She is able to rise easily without assistance from a seated position. Casual gait is normal including stance, stride, and arm swing.        Administrative Statements     I have spent a total time of 30 minutes in caring for this patient on the day of the visit/encounter including Diagnostic results, Prognosis, Risks and benefits of tx options, Instructions for management, Patient and family education, Importance of tx compliance, Risk factor reductions, Impressions, Counseling / Coordination of care, Documenting in the medical record, Reviewing / ordering tests, medicine, procedures  , and Obtaining or reviewing history  .  "

## 2024-10-23 NOTE — ASSESSMENT & PLAN NOTE
Orders:    Galcanezumab-gnlm 120 MG/ML SOAJ; Inject 240 mg under the skin once for 1 dose For just the first month loading dose, followed by 1 injection monthly on separate prescription.    Galcanezumab-gnlm 120 MG/ML SOAJ; Inject 120 mg under the skin every 30 (thirty) days Following the first month loading dose of 2 pens.

## 2024-10-23 NOTE — ASSESSMENT & PLAN NOTE
BMI 37.45  Despite reportedly eating healthy and exercising she is struggling to lose weight  Interested in discussing medication assisted weight loss options  Referral to Bariatric medicine     Orders:    Ambulatory Referral to Weight Management; Future

## 2024-10-23 NOTE — TELEPHONE ENCOUNTER
Pt calling in to have PA for Galcanezumab-gnlm 120 MG/ML SOAJ  started as she was unable to fill medication at Pharmacy.     Please assist, Thank you.

## 2024-10-23 NOTE — ASSESSMENT & PLAN NOTE
Orders:    Galcanezumab-gnlm 120 MG/ML SOAJ; Inject 240 mg under the skin once for 1 dose For just the first month loading dose, followed by 1 injection monthly on separate prescription.    Galcanezumab-gnlm 120 MG/ML SOAJ; Inject 120 mg under the skin every 30 (thirty) days Following the first month loading dose of 2 pens.    SUMAtriptan (IMITREX) 50 mg tablet; Take 1 tablet (50 mg total) by mouth if needed for migraine May repeat 1 tablet in 2 hours if first dose not effective. No more than 3 times per week.

## 2024-10-23 NOTE — ASSESSMENT & PLAN NOTE
Cparice Salomon is a 29 year old female with chronic migraine headaches with prior concern for potential IIH. LP did not reveal an elevated opening pressure and ophthalmology exam was benign and not concerning for papilledema, ruling the diagnosis of IIH out. We discussed due to no concern for IIH she may resume Depo-Provera. She returns today to discuss treatment for her migraines. Her insurance's preferred agent is [employer] (Qulipta and Nurtec QOD were both denied prior). We discussed dosing, administration and common side effects. She is willing to trial this. She will continue to use Excedrin Tension headache as needed, with Sumatriptan as a back up. She will follow up in 12 weeks; sooner if needed.    Orders:    Galcanezumab-gnlm 120 MG/ML SOAJ; Inject 240 mg under the skin once for 1 dose For just the first month loading dose, followed by 1 injection monthly on separate prescription.    Galcanezumab-gnlm 120 MG/ML SOAJ; Inject 120 mg under the skin every 30 (thirty) days Following the first month loading dose of 2 pens.    SUMAtriptan (IMITREX) 50 mg tablet; Take 1 tablet (50 mg total) by mouth if needed for migraine May repeat 1 tablet in 2 hours if first dose not effective. No more than 3 times per week.

## 2024-10-24 NOTE — TELEPHONE ENCOUNTER
Pt called regarding Galcanezumab-gnlm 120 MG/ML SOAJ. She is following up on the PA. Informed her nurse sent it through and waiting for a response.

## 2024-10-24 NOTE — TELEPHONE ENCOUNTER
Inbound call received from patient.    Patient stated that her [employer] Rx needs a PA in order to be filled. RN informed pt that the PA was submitted yesterday and the office is she waiting for a response. Informed pt that the office will call the pt with an update once we hear back from pt's insurance. Pt verbalized understanding and had no further questions.

## 2024-10-24 NOTE — TELEPHONE ENCOUNTER
Called [employer] [facility] in [place]. Spoke with Maria T. He says PA approved.    Approved through 4/24/2025.     Reps says he sees a paid claim today from [facility] pharmacy. Nothing further at this time.

## 2024-10-24 NOTE — TELEPHONE ENCOUNTER
Per ELÍAS PA still pending.     Will call [employer] at a later time. Has been 19 hours since plan submitted. Hoping to receive determination today.

## 2024-11-05 ENCOUNTER — NURSE TRIAGE (OUTPATIENT)
Age: 29
End: 2024-11-05

## 2024-11-05 ENCOUNTER — PATIENT MESSAGE (OUTPATIENT)
Age: 29
End: 2024-11-05

## 2024-11-05 NOTE — TELEPHONE ENCOUNTER
Clinical Team: pt is sending in FMLA documents via pt portal for provider to review and fill out if agreeable

## 2024-11-05 NOTE — TELEPHONE ENCOUNTER
Its been less than 2 weeks since pt did loading dose of Emgality.  It is going to take time for any medication to work- generally would wait 6-12 weeks before deciding if something is effective or not.  Unfortunately, we need more time.   If sumatriptan makes her too tired, we can try an alternative triptan if she'd like

## 2024-11-05 NOTE — TELEPHONE ENCOUNTER
Inbound call received from patient.   Patient reported waking up to daily headaches after receiving the Emgality injection. Reported the pressure/squeezing is on the top of pt's head and usually lasts all day. Pt is unable to take medication while at work, so pt waits until she gets home to take the medication.    Current medications:  --Emgality injection  -- Sumatriptan PO: pt stated she does not take as often because the medication make the pt too tired.   -- OTC Excedrin Migraine    Pt has tried the following with minimal to no relief:  --head massage: makes the HA worse  -- migraine cap: pressure builds up too much and pt has to take it off.  --caffeine: ineffective  -- lying down/resting     Patient is asking if she should continue with the Emgality injections or if another medication can be prescribed to help with daily headaches since starting the injections.     Perla: please review       Reason for Disposition   Similar to previously diagnosed muscle-tension headaches    Answer Assessment - Initial Assessment Questions  When did migraine start? X 3 weeks     Location/Description: squeezing pain, top of head    Associated symptoms:nausea    Precipitating factors: no particular thing    Alleviating factors: otc medication    What medications have you tried for this migraine headache? cold packs, lying in a darkened room, prescription medications: Excedrin Migraine and Sumatriptan  oral (Imitrex ), sleep, unable to obtain relief with OTC medications, and caffeine, massage, migraine cap (after a while causes too much pressure and takes it off)      Current migraine medications are confirmed as:  --Emgality injection  -- Sumatriptan    Protocols used: Headache-Adult-OH

## 2024-11-06 ENCOUNTER — TELEPHONE (OUTPATIENT)
Dept: NEUROLOGY | Facility: CLINIC | Age: 29
End: 2024-11-06

## 2024-11-06 NOTE — TELEPHONE ENCOUNTER
Spoke to pt to verify the dates her spouse was out of work to take care of her for her post LP headache. Pt verified that her spouse needed to be cleared for 9/27/24-10/7/24. Pt also stated she needed to speak to the nurses again regarding her emgality injections. I asked pt if she would like for me to reach out to a nurse and get her immediate care, or if she would like to call back later and speak with a nurse. Pt states she will call back later and request to speak with a nurse regarding her emgality injections. Spouse's FMLA paperwork has been signed by the provider and will be faxed and scanned into the pts chart.

## 2025-01-10 ENCOUNTER — HOSPITAL ENCOUNTER (EMERGENCY)
Facility: HOSPITAL | Age: 30
Discharge: HOME | End: 2025-01-10
Attending: EMERGENCY MEDICINE
Payer: COMMERCIAL

## 2025-01-10 ENCOUNTER — APPOINTMENT (EMERGENCY)
Dept: RADIOLOGY | Facility: HOSPITAL | Age: 30
End: 2025-01-10
Payer: COMMERCIAL

## 2025-01-10 VITALS
TEMPERATURE: 98 F | HEART RATE: 89 BPM | SYSTOLIC BLOOD PRESSURE: 146 MMHG | RESPIRATION RATE: 18 BRPM | OXYGEN SATURATION: 100 % | DIASTOLIC BLOOD PRESSURE: 88 MMHG

## 2025-01-10 DIAGNOSIS — M25.512 LEFT SHOULDER PAIN: Primary | ICD-10-CM

## 2025-01-10 PROCEDURE — 96372 THER/PROPH/DIAG INJ SC/IM: CPT

## 2025-01-10 PROCEDURE — 99283 EMERGENCY DEPT VISIT LOW MDM: CPT

## 2025-01-10 PROCEDURE — 99284 EMERGENCY DEPT VISIT MOD MDM: CPT

## 2025-01-10 PROCEDURE — 73030 X-RAY EXAM OF SHOULDER: CPT

## 2025-01-10 RX ORDER — LIDOCAINE 50 MG/G
1 PATCH TOPICAL ONCE
Status: DISCONTINUED | OUTPATIENT
Start: 2025-01-10 | End: 2025-01-11 | Stop reason: HOSPADM

## 2025-01-10 RX ORDER — METHOCARBAMOL 500 MG/1
500 TABLET, FILM COATED ORAL 2 TIMES DAILY
Qty: 20 TABLET | Refills: 0 | Status: SHIPPED | OUTPATIENT
Start: 2025-01-10

## 2025-01-10 RX ORDER — KETOROLAC TROMETHAMINE 30 MG/ML
15 INJECTION, SOLUTION INTRAMUSCULAR; INTRAVENOUS ONCE
Status: DISCONTINUED | OUTPATIENT
Start: 2025-01-10 | End: 2025-01-10

## 2025-01-10 RX ORDER — KETOROLAC TROMETHAMINE 30 MG/ML
15 INJECTION, SOLUTION INTRAMUSCULAR; INTRAVENOUS ONCE
Status: COMPLETED | OUTPATIENT
Start: 2025-01-10 | End: 2025-01-10

## 2025-01-10 RX ORDER — NAPROXEN 500 MG/1
500 TABLET ORAL 2 TIMES DAILY WITH MEALS
Qty: 30 TABLET | Refills: 0 | Status: SHIPPED | OUTPATIENT
Start: 2025-01-10

## 2025-01-10 RX ADMIN — LIDOCAINE 1 PATCH: 700 PATCH TOPICAL at 21:28

## 2025-01-10 RX ADMIN — KETOROLAC TROMETHAMINE 15 MG: 30 INJECTION, SOLUTION INTRAMUSCULAR at 21:28

## 2025-01-10 NOTE — Clinical Note
Caprice Salomon was seen and treated in our emergency department on 1/10/2025.                Diagnosis:     Caprice  is off the rest of the shift today, may return to work on return date.    She may return on this date: 01/17/2025    Light duty if possible, should not lift more than 5 lbs with left arm. Will see orthopedic     If you have any questions or concerns, please don't hesitate to call.      Garo Wen PA-C    ______________________________           _______________          _______________  Hospital Representative                              Date                                Time

## 2025-01-11 NOTE — DISCHARGE INSTRUCTIONS
Follow-up with PCP and orthopedics.  RICE as discussed.  NSAIDs as needed for pain.  If any symptoms worsen or new symptoms appear return to the ER.

## 2025-01-11 NOTE — ED PROVIDER NOTES
Time reflects when diagnosis was documented in both MDM as applicable and the Disposition within this note       Time User Action Codes Description Comment    1/10/2025  9:47 PM Garo Wen Add [M25.512] Left shoulder pain           ED Disposition       ED Disposition   Discharge    Condition   Stable    Date/Time   Fri Tod 10, 2025  9:47 PM    Comment   Caprice Salomon discharge to home/self care.                   Assessment & Plan       Medical Decision Making  Patient presents with left shoulder joint pain. Given history, exam and workup patient likely has strain vs sprain. I have low suspicion for fracture, dislocation, significant ligamentous injury, septic arthritis, gout flare, new autoimmune arthropathy, or gonococcal arthropathy. Patient neurovascularly intact. No deformity. Xray showed no dislocation or acute osseous abnormalities.  Patient will be treated with rest, ice, compression, NSAIDs.  She will use sling for comfort, discussed gentle range of motion exercises to avoid frozen shoulder.  She will follow-up closely with orthopedics and PCP.Prior to discharge, discharge instructions were discussed with patient at bedside. Patient was provided both verbal and written instructions. Patient is understanding of the discharge instructions and is agreeable to plan of care. Return precautions were discussed with patient bedside, patient verbalized understanding of signs and symptoms that would necessitate return to the ED. All questions were answered. Patient was comfortable with the plan of care and discharged to home.       Problems Addressed:  Left shoulder pain: acute illness or injury    Amount and/or Complexity of Data Reviewed  Radiology: ordered and independent interpretation performed.    Risk  Prescription drug management.             Medications   lidocaine (LIDODERM) 5 % patch 1 patch (1 patch Topical Medication Applied 1/10/25 2125)   ketorolac (TORADOL) injection 15 mg (15 mg Intramuscular Given  1/10/25 2128)       ED Risk Strat Scores                          SBIRT 22yo+      Flowsheet Row Most Recent Value   Initial Alcohol Screen: US AUDIT-C     1. How often do you have a drink containing alcohol? 0 Filed at: 01/10/2025 2034   2. How many drinks containing alcohol do you have on a typical day you are drinking?  0 Filed at: 01/10/2025 2034   3a. Male UNDER 65: How often do you have five or more drinks on one occasion? 0 Filed at: 01/10/2025 2034   3b. FEMALE Any Age, or MALE 65+: How often do you have 4 or more drinks on one occassion? 0 Filed at: 01/10/2025 2034   Audit-C Score 0 Filed at: 01/10/2025 2034   RACQUEL: How many times in the past year have you...    Used an illegal drug or used a prescription medication for non-medical reasons? Never Filed at: 01/10/2025 2034                            History of Present Illness       Chief Complaint   Patient presents with    Shoulder Pain     Left shoulder after closing car door, no trauma, felt a pop        Past Medical History:   Diagnosis Date    GERD (gastroesophageal reflux disease)     Left knee pain     arthroscopy today 10/3/2023    Migraine     Migraines     Wears glasses       Past Surgical History:   Procedure Laterality Date    FL LUMBAR PUNCTURE DIAGNOSTIC  9/27/2024    OVARIAN CYST REMOVAL      [place] ARTHROSCOPY KNEE REMOVAL LOOSE/FOREIGN BODY Left 10/3/2023    Procedure: ARTHROSCOPY KNEE, loose body removal;  Surgeon: Zeus Harrell MD;  Location: [facility] OR;  Service: Orthopedics    TUBAL LIGATION        Family History   Problem Relation Age of Onset    Diabetes Father     Leukemia Brother     Diabetes Maternal Grandmother     Cancer Maternal Grandfather     Lung cancer Maternal Grandfather     Breast cancer Maternal Aunt       Social History     Tobacco Use    Smoking status: Never    Smokeless tobacco: Never   Vaping Use    Vaping status: Never Used   Substance Use Topics    Alcohol use: No    Drug use: No      E-Cigarette/Vaping     E-Cigarette Use Never User       E-Cigarette/Vaping Substances    Nicotine No     THC No     CBD No     Flavoring No     Other No     Unknown No       I have reviewed and agree with the history as documented.     The patient is a 29 y.o. female with a history of GERD, left knee pain, migraine, or psoriasis who presents to [facility] with a chief complaint of left shoulder pain. Symptoms began tonight after closing her car door and have been constant since onset. She reports that she heard a pop. Her pain is currently rated as a 6/10 in severity and described as sharp with some radiation of tingling down the arm. Associated symptoms include mild swelling. Symptoms are aggravated with movement and palpation and alleviating factors include none noted. The patient denies fever, chills, chest pain, shortness of breath, cough, hemoptysis, hematemesis, nausea, vomiting, trauma, numbness, weakness, rash, swelling, neck pain. No other reported symptoms at this time.              History provided by:  Patient   used: No    Shoulder Pain  Associated symptoms: no back pain and no fever        Review of Systems   Constitutional:  Negative for chills and fever.   HENT:  Negative for ear pain and sore throat.    Eyes:  Negative for pain and visual disturbance.   Respiratory:  Negative for cough and shortness of breath.    Cardiovascular:  Negative for chest pain and palpitations.   Gastrointestinal:  Negative for abdominal pain and vomiting.   Genitourinary:  Negative for dysuria and hematuria.   Musculoskeletal:  Positive for arthralgias. Negative for back pain.   Skin:  Negative for color change and rash.   Neurological:  Negative for dizziness, seizures, syncope, facial asymmetry, light-headedness and headaches.   All other systems reviewed and are negative.          Objective       ED Triage Vitals   Temperature Pulse Blood Pressure Respirations SpO2 Patient Position - Orthostatic VS    01/10/25 2033 01/10/25 2033 01/10/25 2033 01/10/25 2033 01/10/25 2033 01/10/25 2033   98 °F (36.7 °C) 89 146/88 18 100 % Sitting      Temp Source Heart Rate Source BP Location FiO2 (%) Pain Score    01/10/25 2033 01/10/25 2033 01/10/25 2033 -- 01/10/25 2128    Temporal Monitor Left arm  8      Vitals      Date and Time Temp Pulse SpO2 Resp BP Pain Score FACES Pain Rating User   01/10/25 2128 -- -- -- -- -- 8 -- JT   01/10/25 2033 98 °F (36.7 °C) 89 100 % 18 146/88 -- -- KW            Physical Exam  Vitals reviewed.   Constitutional:       General: She is not in acute distress.     Appearance: Normal appearance. She is not ill-appearing or toxic-appearing.   HENT:      Head: Normocephalic.      Nose: Nose normal.      Mouth/Throat:      Mouth: Mucous membranes are moist.      Pharynx: Oropharynx is clear.   Eyes:      General: No scleral icterus.     Conjunctiva/sclera: Conjunctivae normal.      Pupils: Pupils are equal, round, and reactive to light.   Cardiovascular:      Rate and Rhythm: Normal rate.      Pulses: Normal pulses.   Pulmonary:      Effort: Pulmonary effort is normal. No respiratory distress.      Breath sounds: Normal breath sounds. No stridor. No wheezing, rhonchi or rales.   Abdominal:      General: Abdomen is flat. Bowel sounds are normal.      Palpations: Abdomen is soft.      Tenderness: There is no abdominal tenderness.   Musculoskeletal:         General: Tenderness present. No swelling, deformity or signs of injury. Normal range of motion.      Right shoulder: Normal pulse.      Left shoulder: Tenderness present. Normal pulse.      Cervical back: Normal range of motion.      Right lower leg: No edema.      Comments: Tenderness directly over biceps tendon and bicipital groove, no deformity, erythema, radial pulse 2+, cap refill <2 sec   Skin:     General: Skin is warm and dry.      Capillary Refill: Capillary refill takes less than 2 seconds.      Coloration: Skin is not jaundiced or pale.       Findings: No bruising, erythema or lesion.   Neurological:      Mental Status: She is alert and oriented to person, place, and time.         Results Reviewed       None            XR shoulder 2+ views LEFT   ED Interpretation by Garo Wen PA-C (01/10 2143)   No acute osseous abnormalities          Procedures    ED Medication and Procedure Management   Prior to Admission Medications   Prescriptions Last Dose Informant Patient Reported? Taking?   Galcanezumab-gnlm 120 MG/ML SOAJ   No No   Sig: Inject 120 mg under the skin every 30 (thirty) days Following the first month loading dose of 2 pens.   Magnesium Gluconate (MAGNESIUM 27 PO)  Self Yes No   Sig: Take by mouth   SUMAtriptan (IMITREX) 50 mg tablet   No No   Sig: Take 1 tablet (50 mg total) by mouth if needed for migraine May repeat 1 tablet in 2 hours if first dose not effective. No more than 3 times per week.   acetaminophen (TYLENOL) 500 mg tablet  Self No No   Sig: Take 2 tablets (1,000 mg total) by mouth every 8 (eight) hours   ibuprofen (MOTRIN) 800 mg tablet  Self Yes No   Patient not taking: Reported on 10/23/2024   medroxyPROGESTERone (DEPO-PROVERA) 150 mg/mL injection  Self Yes No   Sig: Inject 150 mg into a muscle every 3 (three) months   Patient not taking: Reported on 10/23/2024   meloxicam (MOBIC) 7.5 mg tablet  Self Yes No   Sig: Take 7.5 mg by mouth      Facility-Administered Medications: None     Discharge Medication List as of 1/10/2025  9:48 PM        START taking these medications    Details   methocarbamol (ROBAXIN) 500 mg tablet Take 1 tablet (500 mg total) by mouth 2 (two) times a day, Starting Fri 1/10/2025, Normal      naproxen (Naprosyn) 500 mg tablet Take 1 tablet (500 mg total) by mouth 2 (two) times a day with meals, Starting Fri 1/10/2025, Normal           CONTINUE these medications which have NOT CHANGED    Details   acetaminophen (TYLENOL) 500 mg tablet Take 2 tablets (1,000 mg total) by mouth every 8 (eight) hours, Starting  Thu 8/24/2023, Normal      Galcanezumab-gnlm 120 MG/ML SOAJ Inject 120 mg under the skin every 30 (thirty) days Following the first month loading dose of 2 pens., Starting Wed 10/23/2024, Normal      ibuprofen (MOTRIN) 800 mg tablet Historical Med      Magnesium Gluconate (MAGNESIUM 27 PO) Take by mouth, Historical Med      medroxyPROGESTERone (DEPO-PROVERA) 150 mg/mL injection Inject 150 mg into a muscle every 3 (three) months, Historical Med      meloxicam (MOBIC) 7.5 mg tablet Take 7.5 mg by mouth, Starting Fri 11/3/2023, Historical Med      SUMAtriptan (IMITREX) 50 mg tablet Take 1 tablet (50 mg total) by mouth if needed for migraine May repeat 1 tablet in 2 hours if first dose not effective. No more than 3 times per week., Starting Wed 10/23/2024, Normal             ED SEPSIS DOCUMENTATION   Time reflects when diagnosis was documented in both MDM as applicable and the Disposition within this note       Time User Action Codes Description Comment    1/10/2025  9:47 PM Garo Wen Add [M25.512] Left shoulder pain                  Garo Wen PA-C  01/10/25 0890

## 2025-01-22 ENCOUNTER — APPOINTMENT (OUTPATIENT)
Dept: RADIOLOGY | Facility: OTHER | Age: 30
End: 2025-01-22
Payer: COMMERCIAL

## 2025-01-22 ENCOUNTER — OFFICE VISIT (OUTPATIENT)
Dept: OBGYN CLINIC | Facility: OTHER | Age: 30
End: 2025-01-22
Payer: COMMERCIAL

## 2025-01-22 VITALS — WEIGHT: 228 LBS | BODY MASS INDEX: 38.93 KG/M2 | HEIGHT: 64 IN

## 2025-01-22 DIAGNOSIS — M25.561 RIGHT KNEE PAIN, UNSPECIFIED CHRONICITY: Primary | ICD-10-CM

## 2025-01-22 DIAGNOSIS — M25.561 RIGHT KNEE PAIN, UNSPECIFIED CHRONICITY: ICD-10-CM

## 2025-01-22 PROCEDURE — 99213 OFFICE O/P EST LOW 20 MIN: CPT | Performed by: ORTHOPAEDIC SURGERY

## 2025-01-22 PROCEDURE — 73562 X-RAY EXAM OF KNEE 3: CPT

## 2025-01-22 NOTE — PROGRESS NOTES
Orthopaedic Surgery - Office Note  Caprice Salomon (29 y.o. female)   : 1995   MRN: [id number]  Encounter Date: 2025    Assessment / Plan  Right knee patellar tendinitis  Left knee patellar tendonitis improved   S/p left knee arthroscopy with loose body removal on 10/3/2023    She did have good improvement with the L hip with PT exercises since her last visit in 2024. We did review the treatment for patellar tendonitis and she will continue with treatment options at this time.   If she does not have improvement over the next month we will obtain a MRI study of the R knee to further evaluate.  She will contact the office if this is the case.  Continue home exercise program with focus on hip and thigh strengthening.  We did stressed being diligent with modalities such as ice and anti-inflammatories at this time.  Continue with activity as tolerated.  Follow-up:  Return if symptoms worsen or fail to improve.      Chief Complaint / Date of Onset  Increased right patellar tendon pain similar to her left side over the last 1 to 2 weeks without cause  Injury Mechanism / Date  None  Surgery / Date  Left knee arthroscopy with loose body removal on 10/03/2023    History of Present Illness   Caprice Salomon is a 29 y.o. female with increasing anterior right knee pain.  She did not have any specific injury but over the last week or 2 she has been having swelling and pain in the anterior aspect of her right knee similar to what she had in July which was diagnosed as patellar tendinitis of her left knee.  The patellar tendinitis of her left knee did resolve after doing exercises to the point where now she can go up and down stairs without pain.  Currently she is denying any joint line pain on either knee.  The pain that she is having in the anterior aspect of the right knee is worse with any type of squatting or getting up from a chair.  She does get popping or crepitus on the right knee anteriorly that is  "uncomfortable.  She states that she has continued with her physical therapy exercises in both legs since she was having issues with the left knee in the summer.  She has not had any instability in her knees or the feeling of giving way.  She denies any numbness and tingling, however, she does have notes of a burning sensation.    Treatment Summary  Medications / Modalities  Ice and heat with mild relief  Bracing / Immobilization  Patella tendon strap with some relief  Physical Therapy  Completed post operatively and does her HEP every morning  Injections  05/09/2023 by Dr. Estes with minimal releif  Prior Surgeries  None  Other Treatments  None    Employment / Current Status  [employer], prn    Sport / Organization / Current Status  Active       Review of Systems  Pertinent items are noted in HPI.  All other systems were reviewed and are negative.      Physical Exam  Ht 5' 4\" (1.626 m)   Wt 103 kg (228 lb)   BMI 39.14 kg/m²   Cons: Appears well.  No apparent distress.  Psych: Alert. Oriented x3.  Mood and affect normal.  Eyes: PERRLA, EOMI  Resp: Normal effort.  No audible wheezing or stridor.  CV: Palpable pulse.  No discernable arrhythmia.  No LE edema.  Lymph:  No palpable cervical, axillary, or inguinal lymphadenopathy.  Skin: Warm.  No palpable masses.  No visible lesions.  Neuro: Normal muscle tone.  Normal and symmetric DTR's.     Left Knee Exam  Alignment:   Correctable genu valgus.  Inspection:  No swelling. No edema. No erythema. No ecchymosis.  Palpation:   Mild tenderness at the inferior patella to the patellar tendon. No effusion.  ROM:  Knee Extension 0. Knee Flexion 130.  Strength:  Quadriceps 5-/5. Hamstrings 5-/5. Strength limited due to pain.  Stability:  No objective knee instability. Stable Varus / Valgus stress, Lachman, and Posterior drawer.  Tests:  (-) Sancho.  Patella:  Patella tracks centrally with crepitus.  Neurovascular:  Sensation intact in DP/SP/Alvarez/Sa/T nerve distributions.  2+ DP " & PT pulses.  Gait:  Smooth.     Right Knee Exam  Alignment:  Normal knee alignment.  Inspection:   Mild swelling.  Palpation:   Moderate tenderness at inferior aspect of the patella into the patellar tendon with mild tenderness on the medial joint line of the knee.  ROM:  Knee Extension 0. Knee Flexion 120.  Strength:  Quadriceps 5-/5. Hamstrings 5-/5.  Stability:  No objective knee instability. Stable Varus / Valgus stress, Lachman, and Posterior drawer.  Tests:  (+) Sancho.  For anterior medial knee pain  Patella:  Patella tracks centrally with crepitus.  Neurovascular:  Sensation intact in DP/SP/Alvarez/Sa/T nerve distributions. Sensation intact in all digital nerve distributions.  Toes warm and perfused.  Gait:  Smooth.       Studies Reviewed  I have personally reviewed pertinent films in PACS.  XR of right knee - from 1/22/2025 shows no notable arthritic changes no fracture or dislocation.  MRI of left knee - images from 05/23/2024 showing focal full-thickness cartilage defect of lateral patellar facet       Procedures  No procedures today.    Medical, Surgical, Family, and Social History  The patient's medical history, family history, and social history, were reviewed and updated as appropriate.    Past Medical History:   Diagnosis Date    GERD (gastroesophageal reflux disease)     Left knee pain     arthroscopy today 10/3/2023    Migraine     Migraines     Wears glasses        Past Surgical History:   Procedure Laterality Date    FL LUMBAR PUNCTURE DIAGNOSTIC  9/27/2024    OVARIAN CYST REMOVAL      [place] ARTHROSCOPY KNEE REMOVAL LOOSE/FOREIGN BODY Left 10/3/2023    Procedure: ARTHROSCOPY KNEE, loose body removal;  Surgeon: Zeus Harrell MD;  Location: [facility];  Service: Orthopedics    TUBAL LIGATION         Family History   Problem Relation Age of Onset    Diabetes Father     Leukemia Brother     Diabetes Maternal Grandmother     Cancer Maternal Grandfather     Lung cancer Maternal Grandfather      Breast cancer Maternal Aunt        Social History     Occupational History    Not on file   Tobacco Use    Smoking status: Never    Smokeless tobacco: Never   Vaping Use    Vaping status: Never Used   Substance and Sexual Activity    Alcohol use: No    Drug use: No    Sexual activity: Yes     Partners: Male       Allergies   Allergen Reactions    Alum Sulfate-Ca Acetate Hives    Other Hives     Pickles    Pickled Meat     Pollen Extract Nasal Congestion    Shellfish Allergy - Food Allergy Hives     pickles    Betadine [Povidone Iodine] Hives and Rash    Latex Rash         Current Outpatient Medications:     acetaminophen (TYLENOL) 500 mg tablet, Take 2 tablets (1,000 mg total) by mouth every 8 (eight) hours, Disp: 40 tablet, Rfl: 0    Galcanezumab-gnlm 120 MG/ML SOAJ, Inject 120 mg under the skin every 30 (thirty) days Following the first month loading dose of 2 pens., Disp: 1 mL, Rfl: 11    Magnesium Gluconate (MAGNESIUM 27 PO), Take by mouth, Disp: , Rfl:     meloxicam (MOBIC) 7.5 mg tablet, Take 7.5 mg by mouth, Disp: , Rfl:     SUMAtriptan (IMITREX) 50 mg tablet, Take 1 tablet (50 mg total) by mouth if needed for migraine May repeat 1 tablet in 2 hours if first dose not effective. No more than 3 times per week., Disp: 9 tablet, Rfl: 5    ibuprofen (MOTRIN) 800 mg tablet, , Disp: , Rfl:     medroxyPROGESTERone (DEPO-PROVERA) 150 mg/mL injection, Inject 150 mg into a muscle every 3 (three) months (Patient not taking: Reported on 10/23/2024), Disp: , Rfl:     methocarbamol (ROBAXIN) 500 mg tablet, Take 1 tablet (500 mg total) by mouth 2 (two) times a day (Patient not taking: Reported on 1/22/2025), Disp: 20 tablet, Rfl: 0    naproxen (Naprosyn) 500 mg tablet, Take 1 tablet (500 mg total) by mouth 2 (two) times a day with meals (Patient not taking: Reported on 1/22/2025), Disp: 30 tablet, Rfl: 0      Sriram Tesfaye PA-C    Scribe Attestation      I,:   am acting as a scribe while in the presence of the attending  physician.:       I,:   personally performed the services described in this documentation    as scribed in my presence.:

## 2025-01-28 ENCOUNTER — APPOINTMENT (EMERGENCY)
Dept: RADIOLOGY | Facility: HOSPITAL | Age: 30
End: 2025-01-28
Payer: COMMERCIAL

## 2025-01-28 ENCOUNTER — TELEPHONE (OUTPATIENT)
Age: 30
End: 2025-01-28

## 2025-01-28 ENCOUNTER — HOSPITAL ENCOUNTER (EMERGENCY)
Facility: HOSPITAL | Age: 30
Discharge: HOME | End: 2025-01-28
Attending: EMERGENCY MEDICINE
Payer: COMMERCIAL

## 2025-01-28 VITALS
HEART RATE: 105 BPM | OXYGEN SATURATION: 99 % | DIASTOLIC BLOOD PRESSURE: 62 MMHG | TEMPERATURE: 98.4 F | RESPIRATION RATE: 19 BRPM | SYSTOLIC BLOOD PRESSURE: 126 MMHG

## 2025-01-28 DIAGNOSIS — J10.1 INFLUENZA A: Primary | ICD-10-CM

## 2025-01-28 LAB
FLUAV AG UPPER RESP QL IA.RAPID: POSITIVE
FLUBV AG UPPER RESP QL IA.RAPID: NEGATIVE
SARS-COV+SARS-COV-2 AG RESP QL IA.RAPID: NEGATIVE

## 2025-01-28 PROCEDURE — 99283 EMERGENCY DEPT VISIT LOW MDM: CPT

## 2025-01-28 PROCEDURE — 71045 X-RAY EXAM CHEST 1 VIEW: CPT

## 2025-01-28 PROCEDURE — 87804 INFLUENZA ASSAY W/OPTIC: CPT

## 2025-01-28 PROCEDURE — 99284 EMERGENCY DEPT VISIT MOD MDM: CPT

## 2025-01-28 PROCEDURE — 87811 SARS-COV-2 COVID19 W/OPTIC: CPT

## 2025-01-28 RX ORDER — ACETAMINOPHEN 325 MG/1
975 TABLET ORAL ONCE
Status: DISCONTINUED | OUTPATIENT
Start: 2025-01-28 | End: 2025-01-28

## 2025-01-28 NOTE — DISCHARGE INSTRUCTIONS
May alternate Tylenol and Motrin as needed for fever and pain. Increase fluid intake and rest. Follow-up with PCP and return to ED for any worsening symptoms.

## 2025-01-28 NOTE — Clinical Note
Caprice Salomon was seen and treated in our emergency department on 1/28/2025.                Diagnosis:     Caprice  .    She may return on this date: 01/30/2025         If you have any questions or concerns, please don't hesitate to call.      JENNI Acevedo    ______________________________           _______________          _______________  Hospital Representative                              Date                                Time

## 2025-01-28 NOTE — ED PROVIDER NOTES
Time reflects when diagnosis was documented in both MDM as applicable and the Disposition within this note       Time User Action Codes Description Comment    1/28/2025 10:15 AM Beckie Duvall Add [J10.1] Influenza A           ED Disposition       ED Disposition   Discharge    Condition   Stable    Date/Time   Tue Jan 28, 2025 10:15 AM    Comment   Caprice Salomon discharge to home/self care.                   Assessment & Plan       Medical Decision Making  Patient is a 29-year-old female presenting for flu-like symptoms. Upon examination, patient is well-appearing and does not appear in acute distress. Vital signs show a heart rate of 105 but are otherwise within normal limits. Normal heart and lung sounds. Mild reproducible mid chest pain.     Differentials include but are not limited to: pneumonia, COVID, influenza, and other viral syndromes.    X-ray performed which did not show any evidence of pneumonia on my interpretation. Patient tested positive for influenza A while she was in the ED. She was offered medications for pain but had declined at this time as she notes she will take them at home. Advised patient that she should alternat Tylenol and Motrin for pain and may use warm/cool fluids, cough drops, and throat spray for throat irritation. Should follow-up with PCP and strict ED return precautions reviewed. Patient verbalizes understanding of discharge instructions and follow-up care at this time.     Amount and/or Complexity of Data Reviewed  Labs:  Decision-making details documented in ED Course.  Radiology: ordered and independent interpretation performed.        ED Course as of 01/29/25 1218   Tue Jan 28, 2025   0948 Influenza A Rapid Antigen(!): Positive   1015 No pneumonia noted on my interpretation of chest x-ray.       Medications - No data to display    ED Risk Strat Scores                          SBIRT 22yo+      Flowsheet Row Most Recent Value   Initial Alcohol Screen: US AUDIT-C     1. How often  "do you have a drink containing alcohol? 0 Filed at: 01/28/2025 0930   2. How many drinks containing alcohol do you have on a typical day you are drinking?  0 Filed at: 01/28/2025 0930   3b. FEMALE Any Age, or MALE 65+: How often do you have 4 or more drinks on one occassion? 0 Filed at: 01/28/2025 0930   Audit-C Score 0 Filed at: 01/28/2025 0930   RACQUEL: How many times in the past year have you...    Used an illegal drug or used a prescription medication for non-medical reasons? Never Filed at: 01/28/2025 0930                            History of Present Illness       Chief Complaint   Patient presents with    Flu Symptoms     States \"I feel like I got hit by a truck\"- headache, chills, nausea, cough. Whole house feels like this.        Past Medical History:   Diagnosis Date    GERD (gastroesophageal reflux disease)     Left knee pain     arthroscopy today 10/3/2023    Migraine     Migraines     Wears glasses       Past Surgical History:   Procedure Laterality Date    FL LUMBAR PUNCTURE DIAGNOSTIC  9/27/2024    OVARIAN CYST REMOVAL      NV ARTHROSCOPY KNEE REMOVAL LOOSE/FOREIGN BODY Left 10/3/2023    Procedure: ARTHROSCOPY KNEE, loose body removal;  Surgeon: Zeus Harrell MD;  Location: [facility] OR;  Service: Orthopedics    TUBAL LIGATION        Family History   Problem Relation Age of Onset    Diabetes Father     Leukemia Brother     Diabetes Maternal Grandmother     Cancer Maternal Grandfather     Lung cancer Maternal Grandfather     Breast cancer Maternal Aunt       Social History     Tobacco Use    Smoking status: Never    Smokeless tobacco: Never   Vaping Use    Vaping status: Never Used   Substance Use Topics    Alcohol use: No    Drug use: No      E-Cigarette/Vaping    E-Cigarette Use Never User       E-Cigarette/Vaping Substances    Nicotine No     THC No     CBD No     Flavoring No     Other No     Unknown No       I have reviewed and agree with the history as documented.     Patient is a 29-year-old " female with a past medical history including migraines and GERD. Presents today for evaluation of flu-like symptoms which had started yesterday. Has been experiencing chills, back pain, myalgia, dry cough, a raspy voice, loss of taste, and chills. Also has had some mild shortness of breath. She does not report any fevers, sore throat, chest pain, or abdominal pain. Is still eating and drinking normally. No medications taken PTA. Children are sick with similar symptoms.       Flu Symptoms  Presenting symptoms: cough, fever, myalgias and shortness of breath    Presenting symptoms: no diarrhea, no nausea, no rhinorrhea, no sore throat and no vomiting    Associated symptoms: chills    Associated symptoms: no congestion        Review of Systems   Constitutional:  Positive for chills and fever.   HENT:  Negative for congestion, rhinorrhea and sore throat.    Respiratory:  Positive for cough and shortness of breath.    Cardiovascular:  Negative for chest pain.   Gastrointestinal:  Negative for abdominal pain, diarrhea, nausea and vomiting.   Musculoskeletal:  Positive for back pain and myalgias.   All other systems reviewed and are negative.          Objective       ED Triage Vitals [01/28/25 0909]   Temperature Pulse Blood Pressure Respirations SpO2 Patient Position - Orthostatic VS   98.4 °F (36.9 °C) 105 126/62 19 99 % Sitting      Temp Source Heart Rate Source BP Location FiO2 (%) Pain Score    Temporal Monitor Left arm -- --      Vitals      Date and Time Temp Pulse SpO2 Resp BP Pain Score FACES Pain Rating User   01/28/25 0909 98.4 °F (36.9 °C) 105 99 % 19 126/62 -- -- GP            Physical Exam  Vitals and nursing note reviewed.   Constitutional:       Appearance: Normal appearance.   Cardiovascular:      Rate and Rhythm: Tachycardia present.      Heart sounds: Normal heart sounds.   Pulmonary:      Effort: Pulmonary effort is normal.      Breath sounds: Normal breath sounds. No wheezing.   Chest:      Chest wall:  Tenderness present.          Comments: Reproducible chest wall tenderness.   Abdominal:      General: Abdomen is flat. Bowel sounds are normal.      Palpations: Abdomen is soft.      Tenderness: There is no abdominal tenderness.   Musculoskeletal:         General: Normal range of motion.      Cervical back: Normal range of motion and neck supple. No tenderness.   Lymphadenopathy:      Cervical: No cervical adenopathy.   Skin:     General: Skin is warm and dry.      Capillary Refill: Capillary refill takes less than 2 seconds.   Neurological:      General: No focal deficit present.      Mental Status: She is alert and oriented to person, place, and time.   Psychiatric:         Mood and Affect: Mood normal.         Behavior: Behavior normal.         Results Reviewed       Procedure Component Value Units Date/Time    FLU/COVID Rapid Antigen (30 min. TAT) - Preferred screening test in ED [[id number]]  (Abnormal) Collected: 01/28/25 0917    Lab Status: Final result Specimen: Nares from Nose Updated: 01/28/25 0944     SARS COV Rapid Antigen Negative     Influenza A Rapid Antigen Positive     Influenza B Rapid Antigen Negative    Narrative:      This test has been performed using the Quidel Anika 2 FLU+SARS Antigen test under the Emergency Use Authorization (EUA). This test has been validated by the  and verified by the performing laboratory. The Anika uses lateral flow immunofluorescent sandwich assay to detect SARS-COV, Influenza A and Influenza B Antigen.     The Quidel Anika 2 SARS Antigen test does not differentiate between SARS-CoV and SARS-CoV-2.     Negative results are presumptive and may be confirmed with a molecular assay, if necessary, for patient management. Negative results do not rule out SARS-CoV-2 or influenza infection and should not be used as the sole basis for treatment or patient management decisions. A negative test result may occur if the level of antigen in a sample is below the limit of  detection of this test.     Positive results are indicative of the presence of viral antigens, but do not rule out bacterial infection or co-infection with other viruses.     All test results should be used as an adjunct to clinical observations and other information available to the provider.    FOR PEDIATRIC PATIENTS - copy/paste COVID Guidelines URL to browser: [email or web address]            XR chest 1 view portable   ED Interpretation by JENNI Acevedo (01/28 1015)   No pneumonia noted on my interpretation of chest x-ray.      Final Interpretation by Zeus Ramirez MD (01/28 1128)      No acute cardiopulmonary disease.            Workstation performed: [id number]             Procedures    ED Medication and Procedure Management   Prior to Admission Medications   Prescriptions Last Dose Informant Patient Reported? Taking?   Magnesium Gluconate (MAGNESIUM 27 PO)  Self Yes No   Sig: Take by mouth   acetaminophen (TYLENOL) 500 mg tablet  Self No No   Sig: Take 2 tablets (1,000 mg total) by mouth every 8 (eight) hours   medroxyPROGESTERone (DEPO-PROVERA) 150 mg/mL injection  Self Yes No   Sig: Inject 150 mg into a muscle every 3 (three) months   Patient not taking: Reported on 10/23/2024   meloxicam (MOBIC) 7.5 mg tablet  Self Yes No   Sig: Take 7.5 mg by mouth      Facility-Administered Medications: None     Discharge Medication List as of 1/28/2025 10:16 AM        CONTINUE these medications which have NOT CHANGED    Details   acetaminophen (TYLENOL) 500 mg tablet Take 2 tablets (1,000 mg total) by mouth every 8 (eight) hours, Starting u 8/24/2023, Normal      Magnesium Gluconate (MAGNESIUM 27 PO) Take by mouth, Historical Med      medroxyPROGESTERone (DEPO-PROVERA) 150 mg/mL injection Inject 150 mg into a muscle every 3 (three) months, Historical Med      meloxicam (MOBIC) 7.5 mg tablet Take 7.5 mg by mouth, Starting Fri 11/3/2023, Historical Med       Galcanezumab-gnlm 120 MG/ML SOAJ Inject 120 mg under the skin every 30 (thirty) days Following the first month loading dose of 2 pens., Starting Wed 10/23/2024, Normal      ibuprofen (MOTRIN) 800 mg tablet Historical Med      methocarbamol (ROBAXIN) 500 mg tablet Take 1 tablet (500 mg total) by mouth 2 (two) times a day, Starting Fri 1/10/2025, Normal      naproxen (Naprosyn) 500 mg tablet Take 1 tablet (500 mg total) by mouth 2 (two) times a day with meals, Starting Fri 1/10/2025, Normal      SUMAtriptan (IMITREX) 50 mg tablet Take 1 tablet (50 mg total) by mouth if needed for migraine May repeat 1 tablet in 2 hours if first dose not effective. No more than 3 times per week., Starting Wed 10/23/2024, Normal           No discharge procedures on file.  ED SEPSIS DOCUMENTATION   Time reflects when diagnosis was documented in both MDM as applicable and the Disposition within this note       Time User Action Codes Description Comment    1/28/2025 10:15 AM Beckie Duvall Add [J10.1] Influenza A                  JENNI Acevedo  01/29/25 1218

## 2025-01-28 NOTE — TELEPHONE ENCOUNTER
Called spoke to patient re: positive flu test, pt stated she was also going to call CC to request VV due to being sick, Britany to be changed to virtual.

## 2025-01-29 ENCOUNTER — TELEMEDICINE (OUTPATIENT)
Age: 30
End: 2025-01-29
Payer: COMMERCIAL

## 2025-01-29 ENCOUNTER — TELEPHONE (OUTPATIENT)
Age: 30
End: 2025-01-29

## 2025-01-29 DIAGNOSIS — G43.709 CHRONIC MIGRAINE WITHOUT AURA WITHOUT STATUS MIGRAINOSUS, NOT INTRACTABLE: Primary | ICD-10-CM

## 2025-01-29 PROCEDURE — 99214 OFFICE O/P EST MOD 30 MIN: CPT

## 2025-01-29 RX ORDER — RIZATRIPTAN BENZOATE 10 MG/1
10 TABLET ORAL AS NEEDED
Qty: 12 TABLET | Refills: 3 | Status: SHIPPED | OUTPATIENT
Start: 2025-01-29

## 2025-01-29 NOTE — PROGRESS NOTES
Virtual Regular Visit  Name: Caprice Salomon      : 1995      MRN: [id number]  Encounter Provider: JENNI Alcantara  Encounter Date: 2025   Encounter department: [facility] [place]      Verification of patient location:  Patient is located at Home in the following state in which I hold an active license [place] :  Assessment & Plan  Chronic migraine without aura without status migrainosus, not intractable  Caprice Salomon is a 29 year old female with chronic migraine headaches with prior concern for potential IIH. LP did not reveal an elevated opening pressure and ophthalmology exam was benign and not concerning for papilledema, ruling the diagnosis of IIH out. Although she feels her migraines were improved while on Emgality she was unable to tolerate adverse effects that she feels were associated. Her insurance's next preferred agent is Aimovig (she must try and fail this prior to approval for nurtec or Qulipta). She is agreeable to a trial of Aimovig. She will notify me of any adverse effects. She also has found sumatriptan to be effective in aborting her migraines but causes sedation and jaw tightness, as an alternative she will trial rizatriptan. She will follow up in 3 months; sooner if needed.    Orders:    Erenumab-aooe 140 MG/ML SOAJ; Inject 140 mg under the skin every 30 (thirty) days    rizatriptan (MAXALT) 10 mg tablet; Take 1 tablet (10 mg total) by mouth as needed for migraine May repeat once in 2 hours if needed.  Limit 3 a week or 12 a month    Patient Instructions:  Headache/migraine treatment:   - When you have a moderate to severe headache, you should seek rest, initiate relaxation and apply cold compresses to the head.      Abortive medications (for immediate treatment of a headache):   It is ok to take ibuprofen, acetaminophen or naproxen (Advil, Tylenol,  Aleve, Excedrin) if they help your headaches you should limit these to no more than 3 times a week to avoid  "medication overuse/rebound headaches.      Discontinue Sumatriptan  Take Excedrin Tension headache at the onset of a migraine headache if not relieved within 30 minutes  Take Rizatriptan 10 mg, you may take an additional 10 mg in 2 hours if needed. Max 20 mg/day, max 3 days per week     Over the counter preventive supplements for headaches/migraines   (to take every day to help prevent headaches - not to take at the time of headache):  [x] Magnesium 500mg daily (If any diarrhea or upset stomach, decrease dose  as tolerated)  [x] Riboflavin (Vitamin B2) 400mg daily (FYI B2 may make your urine bright/neon yellow)     Prescription preventive medications for headaches/migraines   (to take every day to help prevent headaches - not to take at the time of headache):     Discontinue Emgality  Instead start Aimovig 140 mg SQ once monthly     *Typically these types of medications take time untill you see the benefit, although some may see improvement in days, often it may take weeks, especially if the medication is being titrated up to a beneficial level. Please contact us if there are any concerns or questions regarding the medication.      Self-Monitoring:  [x] Headache calendar. Each day angel a number from 0-10 indicating if there was a headache and how bad it was.  This can be used to monitor gradual improvement and is helpful to make medication adjustments. You can do this on paper or there is an JOANNE for a smart phone called \"Migraine e Diary\".      Lifestyle Recommendations:  [x] SLEEP - Maintain a regular sleep schedule: Adults need at least 7-8 hours of uninterrupted a night. Maintain good sleep hygiene:  Going to bed and waking up at consistent times, avoiding excessive daytime naps, avoiding caffeinated beverages in the evening, avoid excessive stimulation in the evening and generally using bed primarily for sleeping.  One hour before bedtime would recommend turning lights down lower, decreasing your activity (may " read quietly, listen to music at a low volume). When you get into bed, should eliminate all technology (no texting, emailing, playing with your phone, iPad or tablet in bed).  [x] HYDRATION - Maintain good hydration.  Drink  2L of fluid a day (4 typical small water bottles)  [x] DIET - Maintain good nutrition. In particular don't skip meals and try and eat healthy balanced meals regularly.  [x] TRIGGERS - Look for other triggers and avoid them: Limit caffeine to 1-2 cups a day or less. Avoid dietary triggers that you have noticed bring on your headaches (this could include aged cheese, peanuts, MSG, aspartame and nitrates).  [x] EXERCISE - physical exercise as we all know is good for you in many ways, and not only is good for your heart, but also is beneficial for your mental health, cognitive health and  chronic pain/headaches. I would encourage at the least 5 days of physical exercise weekly for at least 30 minutes.      Education and Follow-up  [x] Please call with any questions or concerns. Of course if any new concerning symptoms go to the emergency department.  [x] Follow up in 3 months, sooner if needed  [x] Follow up on referral to weight management to discuss obesity and option of GLP1 medications     Encounter provider JENNI Alcantara    The patient was identified by name and date of birth. Caprice Salomon was informed that this is a telemedicine visit and that the visit is being conducted through the Epic Embedded platform. She agrees to proceed..  My office door was closed. No one else was in the room.  She acknowledged consent and understanding of privacy and security of the video platform. The patient has agreed to participate and understands they can discontinue the visit at any time.    Patient is aware this is a billable service.     History of Present Illness     HPI Caprice Salomon is a 29 year old female who presents to the office to follow up on her chronic migraine headaches. In the past  "IIH was ruled out by Ophthalmologic exam with no evidence of papilledema and normal opening pressure on lumbar puncture. She was last seen 10/23/2024 and at that time she was started on Emgality (Qulipta and Nurtec had previously been denied by her insurance).     She returns today in follow up and states she had to discontinue Emgality this month as it was causing her to have chest pain and tachycardia x 1 week after administration. She states she did not take her Emgality this month and she did not have any chest pain. She states she also was told her recent UTI was caused by Emgality. She also states whenever she uses sumatriptan it makes her extremely drowsy and caused jaw tightness. She does feel like while on Emgality her headaches were better and sumatriptan was effective in aborting her migraine as well. She just cannot tolerate the side effects of either medication. Her insurance requires she try Aimovig prior to approval for Nurtec or Qulipta (see denial scanned under media).    Prior HPI:  Caprice Mccauley is a 28 year old female with past medical history of migraine, chronic knee pain, dysmenorrhea on depo provera, tubal ligation (), who presents to establish care for migraine headaches. She was last seen by my colleague John Reid PA-C on 2024. At that time she described a baseline daily frontal pressure type headache, as well as a more intense headache a few times a week described as \"squeezing\" in the bilateral occipital areas that can radiate to bitemporal location. Associated symptoms included: photophobia/phonophobia/vomiting/blurred vision/and bilateral ear ringing. She had not tried many migraine preventatives except daily magnesium use. She stated a recent trial of toradol/reglan/benadryl didn't help much and caused anxiety (likely reglan reaction); fioricet also not effective in the past; mobic she only uses prn. Stress can be a trigger.  At the time of her last visit she was started on " "Topiramate, as well as sumatriptan as needed. She states she did not tolerate Topiramate as it caused her to have bad anxiety and paranoia. She was then trialed on Diamox and this caused her blurred vision and dizziness after just 3 doses. She was then subsequently started on a low dose of Propanolol 10 mg bid. She states this caused vomiting and blurred vision. She was then started on Venlafaxine 37.5 mg 7/24/24, she states she took this for 2 weeks but noticed prior to the next dose she had shakiness and felt as if she was \"crashing\" so she stopped this.     She returns today and states she does use sumatriptan as needed but does this only very sparingly because the 2 times she used it she was excessively sedated. She states she currently still has the same frequency/severity/duration and associated symptoms as previously described. Since she was last seen she did complete further work up:     MRI Brain/MRA head and neck 9/9/2024  1.  No acute intracranial abnormality.  There is a partially empty sella as well as tortuosity of the optic nerves with increased fluid in the optic nerve sheaths.  These may be incidental findings but may also be seen in the setting of idiopathic intracranial hypertension.  Consider ophthalmological funduscopic examination.   2.  MRA of the neck demonstrates no significant stenosis of the carotid or vertebral arteries.   3.  MRA of the head demonstrates no evidence of aneurysm, significant intracranial stenosis, large vessel cut off, or AVM.      She reports vision loss intermittently sometimes for several days up to twice monthly. She also has pressure in her head. She does see Ophthalmology 9/20/24 [place]. She has never seen Ophthalmology prior and as such denies any prior dx of papilledema. Current BMI 37.04. She states her current weight is the heaviest she has ever been. She does note headaches started 7 years ago after gaining weight during her first pregnancy. She is on the " Depo-Provera as contraception.     Review of Systems  Constitutional:  Negative for appetite change, fatigue and fever.   HENT: Negative.  Negative for hearing loss, tinnitus, trouble swallowing and voice change.    Eyes: Negative.  Negative for photophobia, pain and visual disturbance.   Respiratory: Negative.  Negative for shortness of breath.    Cardiovascular: Negative.  Negative for palpitations.   Gastrointestinal: Negative.  Negative for nausea and vomiting.   Endocrine: Negative.  Negative for cold intolerance.   Genitourinary: Negative.  Negative for dysuria, frequency and urgency.   Musculoskeletal:  Negative for back pain, gait problem, myalgias, neck pain and neck stiffness.   Skin: Negative.  Negative for rash.   Allergic/Immunologic: Negative.    Neurological: Negative.  Negative for dizziness, tremors, seizures, syncope, facial asymmetry, speech difficulty, weakness, light-headedness, numbness and headaches.   Hematological: Negative.  Does not bruise/bleed easily.   Psychiatric/Behavioral: Negative.  Negative for confusion, hallucinations and sleep disturbance.    All other systems reviewed and are negative.    I have personally reviewed the MA's review of systems and made changes as necessary.    Objective   LMP  (LMP Unknown)     Physical Exam  On neurological examination the patient was awake, alert, attentive, oriented to person, place, and time. Recent and remote memory intact to conversation with no evidence of language dysfunction. Satisfactory fund of knowledge. Normal attention span and concentration.  Mood, affect and judgement are appropriate. Speech is fluent without dysarthria or aphasia. Face appears symmetric, with no obvious weakness noted.  Audition is intact to casual conversation.    Visit Time  Total Visit Duration: 15 minutes

## 2025-01-29 NOTE — ASSESSMENT & PLAN NOTE
Caprice Salomon is a 29 year old female with chronic migraine headaches with prior concern for potential IIH. LP did not reveal an elevated opening pressure and ophthalmology exam was benign and not concerning for papilledema, ruling the diagnosis of IIH out. Although she feels her migraines were improved while on Emgality she was unable to tolerate adverse effects that she feels were associated. Her insurance's next preferred agent is Aimovig (she must try and fail this prior to approval for nurtec or Qulipta). She is agreeable to a trial of Aimovig. She will notify me of any adverse effects. She also has found sumatriptan to be effective in aborting her migraines but causes sedation and jaw tightness, as an alternative she will trial rizatriptan. She will follow up in 3 months; sooner if needed.    Orders:    Erenumab-aooe 140 MG/ML SOAJ; Inject 140 mg under the skin every 30 (thirty) days    rizatriptan (MAXALT) 10 mg tablet; Take 1 tablet (10 mg total) by mouth as needed for migraine May repeat once in 2 hours if needed.  Limit 3 a week or 12 a month    Patient Instructions:

## 2025-01-29 NOTE — TELEPHONE ENCOUNTER
Please print initial completed forms and we can cross out and update forms to reflect 10/9 (although I did review chart and confirmed patient was the one who originally gave us the date of 10/7 for the forms). I will then re-sign the forms with today's date.

## 2025-01-29 NOTE — TELEPHONE ENCOUNTER
Pt called regarding FMLA forms that we completed for her  after pt had a LP.  She states that they are trying to deny his leave as his leave did not match the form dates.    He returned to work on 10/9    Per FMLA form the dates were listed as 9/27/24-10/7/24    She is asking for form be updated to cover from 9/27/24-10/9/24  She is asking if this can be completed asap.  She states that they only gave him till tomorrow to have this updated.    She asked that we fax the updated form and a copy sent to her via SMS [employer].      Perla-are you agreeable to adjusting date?    [phone]-ok to leave detailed message

## 2025-01-29 NOTE — PATIENT INSTRUCTIONS
"Headache/migraine treatment:   - When you have a moderate to severe headache, you should seek rest, initiate relaxation and apply cold compresses to the head.      Abortive medications (for immediate treatment of a headache):   It is ok to take ibuprofen, acetaminophen or naproxen (Advil, Tylenol,  Aleve, Excedrin) if they help your headaches you should limit these to no more than 3 times a week to avoid medication overuse/rebound headaches.      Discontinue Sumatriptan  Take Excedrin Tension headache at the onset of a migraine headache if not relieved within 30 minutes  Take Rizatriptan 10 mg, you may take an additional 10 mg in 2 hours if needed. Max 20 mg/day, max 3 days per week     Over the counter preventive supplements for headaches/migraines   (to take every day to help prevent headaches - not to take at the time of headache):  [x] Magnesium 500mg daily (If any diarrhea or upset stomach, decrease dose  as tolerated)  [x] Riboflavin (Vitamin B2) 400mg daily (FYI B2 may make your urine bright/neon yellow)     Prescription preventive medications for headaches/migraines   (to take every day to help prevent headaches - not to take at the time of headache):     Discontinue Emgality  Instead start Aimovig 140 mg SQ once monthly     *Typically these types of medications take time untill you see the benefit, although some may see improvement in days, often it may take weeks, especially if the medication is being titrated up to a beneficial level. Please contact us if there are any concerns or questions regarding the medication.      Self-Monitoring:  [x] Headache calendar. Each day angel a number from 0-10 indicating if there was a headache and how bad it was.  This can be used to monitor gradual improvement and is helpful to make medication adjustments. You can do this on paper or there is an JOANNE for a smart phone called \"Migraine e Diary\".      Lifestyle Recommendations:  [x] SLEEP - Maintain a regular sleep " schedule: Adults need at least 7-8 hours of uninterrupted a night. Maintain good sleep hygiene:  Going to bed and waking up at consistent times, avoiding excessive daytime naps, avoiding caffeinated beverages in the evening, avoid excessive stimulation in the evening and generally using bed primarily for sleeping.  One hour before bedtime would recommend turning lights down lower, decreasing your activity (may read quietly, listen to music at a low volume). When you get into bed, should eliminate all technology (no texting, emailing, playing with your phone, iPad or tablet in bed).  [x] HYDRATION - Maintain good hydration.  Drink  2L of fluid a day (4 typical small water bottles)  [x] DIET - Maintain good nutrition. In particular don't skip meals and try and eat healthy balanced meals regularly.  [x] TRIGGERS - Look for other triggers and avoid them: Limit caffeine to 1-2 cups a day or less. Avoid dietary triggers that you have noticed bring on your headaches (this could include aged cheese, peanuts, MSG, aspartame and nitrates).  [x] EXERCISE - physical exercise as we all know is good for you in many ways, and not only is good for your heart, but also is beneficial for your mental health, cognitive health and  chronic pain/headaches. I would encourage at the least 5 days of physical exercise weekly for at least 30 minutes.      Education and Follow-up  [x] Please call with any questions or concerns. Of course if any new concerning symptoms go to the emergency department.  [x] Follow up in 3 months, sooner if needed  [x] Follow up on referral to weight management to discuss obesity and option of GLP1 medications

## 2025-01-29 NOTE — PROGRESS NOTES
Name: Caprice Salomon      : 1995      MRN: [id number]  Encounter Provider: JENNI Alcantara  Encounter Date: 2025   Encounter department: [facility]  :  Assessment & Plan      {Ambulatory Patient Instructions (Optional):71085}    History of Present Illness {?Quick Links Encounters * My Last Note * Last Note in Specialty * Snapshot * Since Last Visit * History :90403}  HPI  Review of Systems   Constitutional:  Negative for appetite change, fatigue and fever.   HENT: Negative.  Negative for hearing loss, tinnitus, trouble swallowing and voice change.    Eyes: Negative.  Negative for photophobia, pain and visual disturbance.   Respiratory: Negative.  Negative for shortness of breath.    Cardiovascular: Negative.  Negative for palpitations.   Gastrointestinal: Negative.  Negative for nausea and vomiting.   Endocrine: Negative.  Negative for cold intolerance.   Genitourinary: Negative.  Negative for dysuria, frequency and urgency.   Musculoskeletal:  Negative for back pain, gait problem, myalgias, neck pain and neck stiffness.   Skin: Negative.  Negative for rash.   Allergic/Immunologic: Negative.    Neurological: Negative.  Negative for dizziness, tremors, seizures, syncope, facial asymmetry, speech difficulty, weakness, light-headedness, numbness and headaches.   Hematological: Negative.  Does not bruise/bleed easily.   Psychiatric/Behavioral: Negative.  Negative for confusion, hallucinations and sleep disturbance.    All other systems reviewed and are negative.   I have personally reviewed the MA's review of systems and made changes as necessary.    {Select to insert medical history sections (Optional):06046}     Objective {?Quick Links Trend Vitals * Enter New Vitals * Results Review * Timeline (Adult) * Labs * Imaging * Cardiology * Procedures * Lung Cancer Screening * Surgical eConsent :30716}  LMP  (LMP Unknown)     Physical Exam  Neurological Exam    {Radiology  Results Review (Optional):00090}    {Administrative / Billing Section (Optional):34191}

## 2025-01-31 NOTE — TELEPHONE ENCOUNTER
Pt called to check status of FMLA form. Date should be from 9/27/24-10/9/24.   Advised pt of the below. She verbalized understanding    Copy of the fmla sent to pt's mychart as requested.

## 2025-02-20 ENCOUNTER — TELEPHONE (OUTPATIENT)
Age: 30
End: 2025-02-20

## 2025-02-20 NOTE — TELEPHONE ENCOUNTER
Marci from [facility] called in stating that a form was faxed to the office for additional clinical information. Please review and fax over form to [facility].

## 2025-02-20 NOTE — TELEPHONE ENCOUNTER
Inbound call received from Patient to request a Prior Auth be done on Aimovig and rizatriptan. Patient said her pharmacy was trying to reach us since they were ordered 01/29/2025. Patient made aware we will start the Prior Auth.     Outbound call made to [facility] and Shy clarified Aimovig Prior Auth is needed only. Patient's rizatriptan was filled 01/29/2025 and is just too soon to fill if requested.    PA Key: [id number]    Outbound call made to Patient and she was made aware only Aimovig needs the PA. Patient verbalized understanding. Patient as asked if the rizatriptan is working for her or if there is any issue. Patient said she only uses it if she has to. Patient has a migraine today and is using Excedrin tension. Patient declined triaging or needing provider advisement. Patient invited to call us if she has an unmanageable migraine or any questions.     Team please start Aimovig PA, thank you!

## 2025-02-20 NOTE — TELEPHONE ENCOUNTER
Recd call from Rosaline gorman [facility] [phone] requesting fax #. provided [phone]. She states they are working on PA.

## 2025-02-21 NOTE — TELEPHONE ENCOUNTER
Rosaline from [facility] called. Aimovig is approved from 2/21/25-open ended. Called [facility] and left a message making the pharmacist aware of the approval. Sent pt a message through [employer].

## 2025-02-22 ENCOUNTER — APPOINTMENT (OUTPATIENT)
Dept: LAB | Facility: HOSPITAL | Age: 30
End: 2025-02-22
Payer: COMMERCIAL

## 2025-02-22 DIAGNOSIS — N64.52 NIPPLE DISCHARGE: ICD-10-CM

## 2025-02-22 LAB — TSH SERPL DL<=0.05 MIU/L-ACNC: 0.69 UIU/ML (ref 0.45–4.5)

## 2025-02-22 PROCEDURE — 36415 COLL VENOUS BLD VENIPUNCTURE: CPT

## 2025-02-22 PROCEDURE — 84443 ASSAY THYROID STIM HORMONE: CPT

## 2025-02-24 ENCOUNTER — APPOINTMENT (OUTPATIENT)
Dept: LAB | Facility: HOSPITAL | Age: 30
End: 2025-02-24
Payer: COMMERCIAL

## 2025-02-24 LAB — PROLACTIN SERPL-MCNC: 15.05 NG/ML (ref 3.34–26.72)

## 2025-02-24 PROCEDURE — 36415 COLL VENOUS BLD VENIPUNCTURE: CPT

## 2025-02-24 PROCEDURE — 84146 ASSAY OF PROLACTIN: CPT

## 2025-02-26 ENCOUNTER — APPOINTMENT (OUTPATIENT)
Dept: LAB | Facility: HOSPITAL | Age: 30
End: 2025-02-26
Payer: COMMERCIAL

## 2025-02-26 DIAGNOSIS — R42 ORTHOSTATIC LIGHTHEADEDNESS: ICD-10-CM

## 2025-02-26 LAB
ANION GAP SERPL CALCULATED.3IONS-SCNC: 8 MMOL/L (ref 4–13)
BACTERIA UR QL AUTO: ABNORMAL /HPF
BASOPHILS # BLD AUTO: 0.09 THOUSANDS/ÂΜL (ref 0–0.1)
BASOPHILS NFR BLD AUTO: 1 % (ref 0–1)
BILIRUB UR QL STRIP: NEGATIVE
BUN SERPL-MCNC: 10 MG/DL (ref 5–25)
CALCIUM SERPL-MCNC: 9.1 MG/DL (ref 8.4–10.2)
CHLORIDE SERPL-SCNC: 108 MMOL/L (ref 96–108)
CLARITY UR: ABNORMAL
CO2 SERPL-SCNC: 23 MMOL/L (ref 21–32)
COLOR UR: YELLOW
CREAT SERPL-MCNC: 0.87 MG/DL (ref 0.6–1.3)
CREAT UR-MCNC: 299.6 MG/DL
EOSINOPHIL # BLD AUTO: 0.19 THOUSAND/ÂΜL (ref 0–0.61)
EOSINOPHIL NFR BLD AUTO: 2 % (ref 0–6)
ERYTHROCYTE [DISTWIDTH] IN BLOOD BY AUTOMATED COUNT: 12.9 % (ref 11.6–15.1)
GFR SERPL CREATININE-BSD FRML MDRD: 90 ML/MIN/1.73SQ M
GLUCOSE SERPL-MCNC: 91 MG/DL (ref 65–140)
GLUCOSE UR STRIP-MCNC: NEGATIVE MG/DL
HCT VFR BLD AUTO: 37.5 % (ref 34.8–46.1)
HGB BLD-MCNC: 12 G/DL (ref 11.5–15.4)
HGB UR QL STRIP.AUTO: NEGATIVE
IMM GRANULOCYTES # BLD AUTO: 0.03 THOUSAND/UL (ref 0–0.2)
IMM GRANULOCYTES NFR BLD AUTO: 0 % (ref 0–2)
KETONES UR STRIP-MCNC: ABNORMAL MG/DL
LEUKOCYTE ESTERASE UR QL STRIP: ABNORMAL
LYMPHOCYTES # BLD AUTO: 3.45 THOUSANDS/ÂΜL (ref 0.6–4.47)
LYMPHOCYTES NFR BLD AUTO: 36 % (ref 14–44)
MCH RBC QN AUTO: 28.6 PG (ref 26.8–34.3)
MCHC RBC AUTO-ENTMCNC: 32 G/DL (ref 31.4–37.4)
MCV RBC AUTO: 89 FL (ref 82–98)
MONOCYTES # BLD AUTO: 0.61 THOUSAND/ÂΜL (ref 0.17–1.22)
MONOCYTES NFR BLD AUTO: 6 % (ref 4–12)
MUCOUS THREADS UR QL AUTO: ABNORMAL
NEUTROPHILS # BLD AUTO: 5.28 THOUSANDS/ÂΜL (ref 1.85–7.62)
NEUTS SEG NFR BLD AUTO: 55 % (ref 43–75)
NITRITE UR QL STRIP: NEGATIVE
NON-SQ EPI CELLS URNS QL MICRO: ABNORMAL /HPF
NRBC BLD AUTO-RTO: 0 /100 WBCS
PH UR STRIP.AUTO: 5.5 [PH]
PLATELET # BLD AUTO: 303 THOUSANDS/UL (ref 149–390)
PMV BLD AUTO: 9.7 FL (ref 8.9–12.7)
POTASSIUM SERPL-SCNC: 3.6 MMOL/L (ref 3.5–5.3)
PROT UR STRIP-MCNC: ABNORMAL MG/DL
RBC # BLD AUTO: 4.2 MILLION/UL (ref 3.81–5.12)
RBC #/AREA URNS AUTO: ABNORMAL /HPF
RETICS # AUTO: ABNORMAL 10*3/UL (ref 14097–95744)
RETICS # CALC: 3.14 % (ref 0.37–1.87)
SODIUM SERPL-SCNC: 139 MMOL/L (ref 135–147)
SODIUM UR-SCNC: 114 MMOL/L
SP GR UR STRIP.AUTO: 1.03 (ref 1–1.03)
UROBILINOGEN UR STRIP-ACNC: <2 MG/DL
WBC # BLD AUTO: 9.65 THOUSAND/UL (ref 4.31–10.16)
WBC #/AREA URNS AUTO: ABNORMAL /HPF

## 2025-02-26 PROCEDURE — 80048 BASIC METABOLIC PNL TOTAL CA: CPT

## 2025-02-26 PROCEDURE — 84300 ASSAY OF URINE SODIUM: CPT

## 2025-02-26 PROCEDURE — 83540 ASSAY OF IRON: CPT

## 2025-02-26 PROCEDURE — 85045 AUTOMATED RETICULOCYTE COUNT: CPT

## 2025-02-26 PROCEDURE — 81001 URINALYSIS AUTO W/SCOPE: CPT

## 2025-02-26 PROCEDURE — 83550 IRON BINDING TEST: CPT

## 2025-02-26 PROCEDURE — 82728 ASSAY OF FERRITIN: CPT

## 2025-02-26 PROCEDURE — 36415 COLL VENOUS BLD VENIPUNCTURE: CPT

## 2025-02-26 PROCEDURE — 82570 ASSAY OF URINE CREATININE: CPT

## 2025-02-26 PROCEDURE — 85025 COMPLETE CBC W/AUTO DIFF WBC: CPT

## 2025-02-27 LAB
FERRITIN SERPL-MCNC: 50 NG/ML (ref 11–307)
IRON SATN MFR SERPL: 12 % (ref 15–50)
IRON SERPL-MCNC: 33 UG/DL (ref 50–212)
TIBC SERPL-MCNC: 285.6 UG/DL (ref 250–450)
TRANSFERRIN SERPL-MCNC: 204 MG/DL (ref 203–362)
UIBC SERPL-MCNC: 253 UG/DL (ref 155–355)

## 2025-03-17 ENCOUNTER — HOSPITAL ENCOUNTER (EMERGENCY)
Facility: HOSPITAL | Age: 30
Discharge: HOME | End: 2025-03-17
Attending: EMERGENCY MEDICINE | Admitting: EMERGENCY MEDICINE
Payer: COMMERCIAL

## 2025-03-17 ENCOUNTER — APPOINTMENT (EMERGENCY)
Dept: RADIOLOGY | Facility: HOSPITAL | Age: 30
End: 2025-03-17
Payer: COMMERCIAL

## 2025-03-17 VITALS
RESPIRATION RATE: 18 BRPM | WEIGHT: 226.63 LBS | BODY MASS INDEX: 38.9 KG/M2 | TEMPERATURE: 98 F | SYSTOLIC BLOOD PRESSURE: 121 MMHG | DIASTOLIC BLOOD PRESSURE: 76 MMHG | HEART RATE: 80 BPM | OXYGEN SATURATION: 100 %

## 2025-03-17 DIAGNOSIS — B34.9 VIRAL SYNDROME: Primary | ICD-10-CM

## 2025-03-17 LAB
BACTERIA UR QL AUTO: ABNORMAL /HPF
BILIRUB UR QL STRIP: NEGATIVE
CLARITY UR: CLEAR
COLOR UR: YELLOW
EXT PREGNANCY TEST URINE: NEGATIVE
EXT. CONTROL: NORMAL
FLUAV AG UPPER RESP QL IA.RAPID: NEGATIVE
FLUBV AG UPPER RESP QL IA.RAPID: NEGATIVE
GLUCOSE SERPL-MCNC: 121 MG/DL (ref 65–140)
GLUCOSE UR STRIP-MCNC: NEGATIVE MG/DL
HGB UR QL STRIP.AUTO: ABNORMAL
KETONES UR STRIP-MCNC: NEGATIVE MG/DL
LEUKOCYTE ESTERASE UR QL STRIP: NEGATIVE
MUCOUS THREADS UR QL AUTO: ABNORMAL
NITRITE UR QL STRIP: NEGATIVE
NON-SQ EPI CELLS URNS QL MICRO: ABNORMAL /HPF
PH UR STRIP.AUTO: 5.5 [PH]
PROT UR STRIP-MCNC: NEGATIVE MG/DL
RBC #/AREA URNS AUTO: ABNORMAL /HPF
S PYO DNA THROAT QL NAA+PROBE: NOT DETECTED
SARS-COV+SARS-COV-2 AG RESP QL IA.RAPID: NEGATIVE
SP GR UR STRIP.AUTO: 1.03 (ref 1–1.03)
UROBILINOGEN UR STRIP-ACNC: <2 MG/DL
WBC #/AREA URNS AUTO: ABNORMAL /HPF

## 2025-03-17 PROCEDURE — 87804 INFLUENZA ASSAY W/OPTIC: CPT | Performed by: EMERGENCY MEDICINE

## 2025-03-17 PROCEDURE — 82948 REAGENT STRIP/BLOOD GLUCOSE: CPT

## 2025-03-17 PROCEDURE — 71046 X-RAY EXAM CHEST 2 VIEWS: CPT

## 2025-03-17 PROCEDURE — 99284 EMERGENCY DEPT VISIT MOD MDM: CPT | Performed by: PHYSICIAN ASSISTANT

## 2025-03-17 PROCEDURE — 96372 THER/PROPH/DIAG INJ SC/IM: CPT

## 2025-03-17 PROCEDURE — 81025 URINE PREGNANCY TEST: CPT | Performed by: PHYSICIAN ASSISTANT

## 2025-03-17 PROCEDURE — 87811 SARS-COV-2 COVID19 W/OPTIC: CPT | Performed by: EMERGENCY MEDICINE

## 2025-03-17 PROCEDURE — 81001 URINALYSIS AUTO W/SCOPE: CPT | Performed by: PHYSICIAN ASSISTANT

## 2025-03-17 PROCEDURE — 99283 EMERGENCY DEPT VISIT LOW MDM: CPT

## 2025-03-17 PROCEDURE — 87651 STREP A DNA AMP PROBE: CPT | Performed by: PHYSICIAN ASSISTANT

## 2025-03-17 RX ORDER — KETOROLAC TROMETHAMINE 30 MG/ML
15 INJECTION, SOLUTION INTRAMUSCULAR; INTRAVENOUS ONCE
Status: COMPLETED | OUTPATIENT
Start: 2025-03-17 | End: 2025-03-17

## 2025-03-17 RX ADMIN — KETOROLAC TROMETHAMINE 15 MG: 30 INJECTION, SOLUTION INTRAMUSCULAR; INTRAVENOUS at 09:43

## 2025-03-17 NOTE — ED PROVIDER NOTES
Time reflects when diagnosis was documented in both MDM as applicable and the Disposition within this note       Time User Action Codes Description Comment    3/17/2025  9:51 AM Abelardo Carrion Add [B34.9] Viral syndrome           ED Disposition       ED Disposition   Discharge    Condition   Stable    Date/Time   Mon Mar 17, 2025  9:51 AM    Comment   Caprice Salomon discharge to home/self care.                   Assessment & Plan       Medical Decision Making  28yo F presenting to the ED for evaluation of flulike syndrome over the last 2 days.  Children sick at home.  Positive sick contacts.  Works in healthcare.  Has a sore throat, nasal congestion, cough, and not feeling well.  Had generalized fatigue and myalgias associated this.  No fevers.  Does have associated chills.  She is not tachycardic during my examination heart rate on auscultation about 80 bpm.  Labs on her today, she has had polyuria and polydipsia with fatigue so with her gestational history of diabetes, point-of-care blood glucose shows 121 euglycemia nonfasting.  Urinalysis negative for ketonuria or bacteriuria or glucosuria.  She is tolerating p.o.  Toradol given for analgesic control here today.  Suspect viral syndrome.  Work note provided.  ED return precaution discussed with her bedside verbalized understanding.    Problems Addressed:  Viral syndrome: acute illness or injury    Amount and/or Complexity of Data Reviewed  Labs: ordered. Decision-making details documented in ED Course.  Radiology: ordered and independent interpretation performed. Decision-making details documented in ED Course.    Risk  Prescription drug management.        ED Course as of 03/17/25 1004   Mon Mar 17, 2025   0927 FLU/COVID Rapid Antigen (30 min. TAT) - Preferred screening test in ED  Neg for covid and influenza  Suspect other viral syndrome etiology going around the community    0940 PREGNANCY TEST URINE: Negative   0942 XR chest 2 views  Negative for pneumonia or focal  consolidation    0944 POC Glucose: 121  Patient had complaints of fatigue, polydipsia and polyuria. She has a PMHx of gestational   0959 Epithelial Cells: Occasional   0959 Bacteria, UA: None Seen   0959 WBC, UA: 1-2   0959 RBC Urine: 1-2   1000 Work note provided. ED return precautions discussed. Advised supportive measures       Medications   ketorolac (TORADOL) injection 15 mg (15 mg Intramuscular Given 3/17/25 0943)       ED Risk Strat Scores                                                History of Present Illness       Chief Complaint   Patient presents with    Flu Symptoms     Cough, congestion, chills and throat pain since friday       Past Medical History:   Diagnosis Date    GERD (gastroesophageal reflux disease)     Left knee pain     arthroscopy today 10/3/2023    Migraine     Migraines     Wears glasses       Past Surgical History:   Procedure Laterality Date    FL LUMBAR PUNCTURE DIAGNOSTIC  9/27/2024    OVARIAN CYST REMOVAL      ME ARTHROSCOPY KNEE REMOVAL LOOSE/FOREIGN BODY Left 10/3/2023    Procedure: ARTHROSCOPY KNEE, loose body removal;  Surgeon: Zeus Harrell MD;  Location: [facility];  Service: Orthopedics    TUBAL LIGATION        Family History   Problem Relation Age of Onset    Diabetes Father     Leukemia Brother     Diabetes Maternal Grandmother     Cancer Maternal Grandfather     Lung cancer Maternal Grandfather     Breast cancer Maternal Aunt       Social History     Tobacco Use    Smoking status: Never    Smokeless tobacco: Never   Vaping Use    Vaping status: Never Used   Substance Use Topics    Alcohol use: No    Drug use: No      E-Cigarette/Vaping    E-Cigarette Use Never User       E-Cigarette/Vaping Substances    Nicotine No     THC No     CBD No     Flavoring No     Other No     Unknown No       I have reviewed and agree with the history as documented.       History provided by:  Patient   used: No    Flu Symptoms  Presenting symptoms: cough, fatigue,  myalgias, rhinorrhea and sore throat    Presenting symptoms: no fever, no nausea, no shortness of breath and no vomiting    Cough:     Cough characteristics:  Non-productive and harsh    Sputum characteristics:  Nondescript    Severity:  Moderate    Onset quality:  Gradual    Duration:  2 days    Timing:  Constant    Chronicity:  New  Fatigue:     Severity:  Moderate    Duration:  2 days    Timing:  Constant  Myalgias:     Location:  Generalized    Quality:  Aching    Severity:  Moderate    Duration:  2 days    Timing:  Constant  Rhinorrhea:     Quality:  Clear    Severity:  Moderate    Duration:  2 days  Sore throat:     Severity:  Mild    Duration:  2 days    Timing:  Constant  Severity:  Mild  Onset quality:  Gradual  Duration:  2 days  Chronicity:  New  Relieved by:  Rest  Associated symptoms: chills, decreased appetite and nasal congestion    Associated symptoms: no ear pain, no mental status change, no neck stiffness and no syncope    Risk factors: sick contacts    Risk factors comment:  Children sick at home with cold sx 3 days prior to onset of her symptoms      Review of Systems   Constitutional:  Positive for activity change, appetite change, chills, decreased appetite and fatigue. Negative for fever.   HENT:  Positive for congestion, rhinorrhea and sore throat. Negative for drooling, ear discharge and ear pain.    Respiratory:  Positive for cough. Negative for shortness of breath and wheezing.    Cardiovascular:  Negative for palpitations and leg swelling.   Gastrointestinal:  Negative for nausea and vomiting.   Musculoskeletal:  Positive for myalgias. Negative for neck stiffness.   All other systems reviewed and are negative.          Objective       ED Triage Vitals [03/17/25 0903]   Temperature Pulse Blood Pressure Respirations SpO2 Patient Position - Orthostatic VS   98 °F (36.7 °C) (!) 109 121/76 18 100 % Sitting      Temp Source Heart Rate Source BP Location FiO2 (%) Pain Score    Temporal Monitor  Left arm -- --      Vitals      Date and Time Temp Pulse SpO2 Resp BP Pain Score FACES Pain Rating User   03/17/25 0950 -- 80 -- -- -- -- -- CR   03/17/25 0903 98 °F (36.7 °C) 109 100 % 18 121/76 -- -- FB            Physical Exam  Vitals and nursing note reviewed.   Constitutional:       General: She is not in acute distress.     Appearance: Normal appearance. She is well-developed. She is not ill-appearing, toxic-appearing or diaphoretic.   HENT:      Head: Normocephalic and atraumatic.      Right Ear: External ear normal.      Left Ear: External ear normal.      Nose: Congestion and rhinorrhea present.      Mouth/Throat:      Mouth: Mucous membranes are moist.      Pharynx: Oropharynx is clear. No oropharyngeal exudate or posterior oropharyngeal erythema.   Eyes:      General: No scleral icterus.        Right eye: No discharge.         Left eye: No discharge.      Conjunctiva/sclera: Conjunctivae normal.   Cardiovascular:      Rate and Rhythm: Normal rate and regular rhythm.      Heart sounds: Normal heart sounds. No murmur heard.     No friction rub. No gallop.   Pulmonary:      Effort: Pulmonary effort is normal. No respiratory distress.      Breath sounds: Normal breath sounds. No stridor. No wheezing, rhonchi or rales.   Chest:      Chest wall: No tenderness.   Abdominal:      Palpations: Abdomen is soft.      Tenderness: There is no abdominal tenderness.   Musculoskeletal:         General: No swelling.      Cervical back: Neck supple.      Right lower leg: No edema.      Left lower leg: No edema.   Skin:     General: Skin is warm and dry.      Capillary Refill: Capillary refill takes less than 2 seconds.   Neurological:      Mental Status: She is alert.   Psychiatric:         Mood and Affect: Mood normal.         Results Reviewed       Procedure Component Value Units Date/Time    Urine Microscopic [[id number]]  (Abnormal) Collected: 03/17/25 0936    Lab Status: Final result Specimen: Urine, Clean Catch  Updated: 03/17/25 0959     RBC, UA 1-2 /hpf      WBC, UA 1-2 /hpf      Epithelial Cells Occasional /hpf      Bacteria, UA None Seen /hpf      MUCUS THREADS Innumerable    UA w Reflex to Microscopic w Reflex to Culture [[id number]]  (Abnormal) Collected: 03/17/25 0936    Lab Status: Final result Specimen: Urine, Clean Catch Updated: 03/17/25 0950     Color, UA Yellow     Clarity, UA Clear     Specific Gravity, UA 1.028     pH, UA 5.5     Leukocytes, UA Negative     Nitrite, UA Negative     Protein, UA Negative mg/dl      Glucose, UA Negative mg/dl      Ketones, UA Negative mg/dl      Urobilinogen, UA <2.0 mg/dl      Bilirubin, UA Negative     Occult Blood, UA Trace    Fingerstick Glucose (POCT) [[id number]]  (Normal) Collected: 03/17/25 0941    Lab Status: Final result Specimen: Blood Updated: 03/17/25 0942     POC Glucose 121 mg/dl     Strep A PCR [[id number]]  (Normal) Collected: 03/17/25 0912    Lab Status: Final result Specimen: Throat Updated: 03/17/25 0940     STREP A PCR Not Detected    POCT pregnancy, urine [[id number]]  (Normal) Collected: 03/17/25 0938    Lab Status: Final result Updated: 03/17/25 0939     EXT Preg Test, Ur Negative     Control Valid    FLU/COVID Rapid Antigen (30 min. TAT) - Preferred screening test in ED [[id number]]  (Normal) Collected: 03/17/25 0905    Lab Status: Final result Specimen: Nares from Nose Updated: 03/17/25 0925     SARS COV Rapid Antigen Negative     Influenza A Rapid Antigen Negative     Influenza B Rapid Antigen Negative    Narrative:      This test has been performed using the Quidel Anika 2 FLU+SARS Antigen test under the Emergency Use Authorization (EUA). This test has been validated by the  and verified by the performing laboratory. The Anika uses lateral flow immunofluorescent sandwich assay to detect SARS-COV, Influenza A and Influenza B Antigen.     The Quidel Anika 2 SARS Antigen test does not differentiate between SARS-CoV and SARS-CoV-2.     Negative  results are presumptive and may be confirmed with a molecular assay, if necessary, for patient management. Negative results do not rule out SARS-CoV-2 or influenza infection and should not be used as the sole basis for treatment or patient management decisions. A negative test result may occur if the level of antigen in a sample is below the limit of detection of this test.     Positive results are indicative of the presence of viral antigens, but do not rule out bacterial infection or co-infection with other viruses.     All test results should be used as an adjunct to clinical observations and other information available to the provider.    FOR PEDIATRIC PATIENTS - copy/paste COVID Guidelines URL to browser: [email or web address]            XR chest 2 views   ED Interpretation by Abelardo Carrion PA-C (03/17 0942)   Negative for pneumonia or focal consolidation           Procedures    ED Medication and Procedure Management   Prior to Admission Medications   Prescriptions Last Dose Informant Patient Reported? Taking?   Erenumab-aooe 140 MG/ML SOAJ   No No   Sig: Inject 140 mg under the skin every 30 (thirty) days   Magnesium Gluconate (MAGNESIUM 27 PO)  Self Yes No   Sig: Take by mouth   acetaminophen (TYLENOL) 500 mg tablet  Self No No   Sig: Take 2 tablets (1,000 mg total) by mouth every 8 (eight) hours   medroxyPROGESTERone (DEPO-PROVERA) 150 mg/mL injection  Self Yes No   Sig: Inject 150 mg into a muscle every 3 (three) months   Patient not taking: Reported on 10/23/2024   meloxicam (MOBIC) 7.5 mg tablet  Self Yes No   Sig: Take 7.5 mg by mouth   rizatriptan (MAXALT) 10 mg tablet   No No   Sig: Take 1 tablet (10 mg total) by mouth as needed for migraine May repeat once in 2 hours if needed.  Limit 3 a week or 12 a month      Facility-Administered Medications: None     Patient's Medications   Discharge Prescriptions    No medications on file     No discharge  procedures on file.  ED SEPSIS DOCUMENTATION   Time reflects when diagnosis was documented in both MDM as applicable and the Disposition within this note       Time User Action Codes Description Comment    3/17/2025  9:51 AM Abelardo Carrion Add [B34.9] Viral syndrome                  Abelardo Carrion PA-C  03/17/25 1004

## 2025-03-17 NOTE — Clinical Note
Caprice Salomon was seen and treated in our emergency department on 3/17/2025.    No restrictions            Diagnosis:     Caprice  .    She may return on this date: 03/20/2025         If you have any questions or concerns, please don't hesitate to call.      Abelardo Carrion PA-C    ______________________________           _______________          _______________  Hospital Representative                              Date                                Time

## 2025-04-28 ENCOUNTER — APPOINTMENT (OUTPATIENT)
Dept: LAB | Facility: HOSPITAL | Age: 30
End: 2025-04-28
Payer: COMMERCIAL

## 2025-04-28 ENCOUNTER — TRANSCRIBE ORDERS (OUTPATIENT)
Dept: ADMINISTRATIVE | Facility: HOSPITAL | Age: 30
End: 2025-04-28

## 2025-04-28 DIAGNOSIS — Z00.6 ENCOUNTER FOR EXAMINATION FOR NORMAL COMPARISON OR CONTROL IN CLINICAL RESEARCH PROGRAM: ICD-10-CM

## 2025-04-28 DIAGNOSIS — Z00.8 HEALTH EXAMINATION IN POPULATION SURVEY: Primary | ICD-10-CM

## 2025-04-28 DIAGNOSIS — Z00.8 HEALTH EXAMINATION IN POPULATION SURVEY: ICD-10-CM

## 2025-04-28 LAB
CHOLEST SERPL-MCNC: 144 MG/DL (ref ?–200)
EST. AVERAGE GLUCOSE BLD GHB EST-MCNC: 111 MG/DL
HBA1C MFR BLD: 5.5 %
HDLC SERPL-MCNC: 38 MG/DL
LDLC SERPL CALC-MCNC: 92 MG/DL (ref 0–100)
NONHDLC SERPL-MCNC: 106 MG/DL
TRIGL SERPL-MCNC: 72 MG/DL (ref ?–150)

## 2025-04-28 PROCEDURE — 36415 COLL VENOUS BLD VENIPUNCTURE: CPT

## 2025-04-28 PROCEDURE — 83036 HEMOGLOBIN GLYCOSYLATED A1C: CPT

## 2025-04-28 PROCEDURE — 80061 LIPID PANEL: CPT

## 2025-04-30 ENCOUNTER — TELEPHONE (OUTPATIENT)
Age: 30
End: 2025-04-30

## 2025-04-30 NOTE — TELEPHONE ENCOUNTER
"FOLLOW UP: Please review and advise. Pt schedule for FU appt on 5/2/25 at 1400 with Jb ARTEAGA    REASON FOR CONVERSATION: Migraine and Aimovig Injections SIde Effects    SYMPTOMS: migraine, nausea, dizziness    OTHER: Pt reported worsening migraines with nausea and dizziness a few hours-few days after administering Aimovig injection.     AIMOVIG ADMIN:  1st Injection Feb: massive migraine 3-4 hours after administering injection    2nd injection March: worsening migraine few days after administering injection. Increased sensitivity to touch temples/behind BL ears/side of head.     3rd injection April: massive migraine a few days after injection with increased brain fog, feeling of being \"under water,\" nauseated, no appetite     --Pt has been drinking plenty of fluids, eating regular meals (even though no appetite), and getting plenty of rest. Pt requesting a medication be sent in to [facility] to help break current migraine.     --Pt is not due for her next Aimovig injection until 5/19/25.   Per LOV 1/29/25: \"Her insurance requires she try Aimovig prior to approval for Nurtec or Qulipta (see denial scanned under media).\"  Please advise if pt can be prescribed Nurtec or Qulipta.     DISPOSITION: Discuss with Provider and Call Back Patient    Current migraine medications are confirmed as:  -- Aimovig injections  -- Rizatriptan 10 mg: did not take any yet   --Excedrin Tension HA: does minimize HA a little bit. Only takes occasionally     Medications tried in the past?  --never tried Dexamethasone, Olanzapine, or Depakote in the past.   "

## 2025-04-30 NOTE — TELEPHONE ENCOUNTER
Pt returning our call.  She did not listen to message she just called back.     Reviewed mikhail's message with her.

## 2025-04-30 NOTE — TELEPHONE ENCOUNTER
For her current migraine if she has not taken her abortive Rizatriptan 10 mg she should do that now.  I will discuss alternatives to Aimovig at her visit 5/2

## 2025-04-30 NOTE — TELEPHONE ENCOUNTER
Called pt, no answer. Left detailed message (ok per consent on file) advising pt of Perla's response and recommendations. Advised pt to call back if any questions.

## 2025-05-09 ENCOUNTER — TELEPHONE (OUTPATIENT)
Dept: NEUROLOGY | Facility: CLINIC | Age: 30
End: 2025-05-09

## 2025-05-09 NOTE — TELEPHONE ENCOUNTER
Called pt and offered appointment for same-day appointment. Pt requested to move appointment. ariana to moved appointment to 5/21

## 2025-05-09 NOTE — TELEPHONE ENCOUNTER
LMOM for pt to call back to see if she can move her appointment due to the provider being OOO at the time of her 5/9 appointment

## 2025-05-14 ENCOUNTER — TELEPHONE (OUTPATIENT)
Dept: NEUROLOGY | Facility: CLINIC | Age: 30
End: 2025-05-14

## 2025-05-14 NOTE — TELEPHONE ENCOUNTER
LMOM to confirm patient's appointment on  5/21 with Christopher. Attempted to confirm time, date, location.

## 2025-05-21 ENCOUNTER — TELEPHONE (OUTPATIENT)
Age: 30
End: 2025-05-21

## 2025-05-21 ENCOUNTER — OFFICE VISIT (OUTPATIENT)
Age: 30
End: 2025-05-21
Payer: COMMERCIAL

## 2025-05-21 VITALS
BODY MASS INDEX: 40.53 KG/M2 | OXYGEN SATURATION: 99 % | DIASTOLIC BLOOD PRESSURE: 84 MMHG | HEIGHT: 64 IN | HEART RATE: 62 BPM | SYSTOLIC BLOOD PRESSURE: 120 MMHG | WEIGHT: 237.4 LBS

## 2025-05-21 DIAGNOSIS — G43.709 CHRONIC MIGRAINE WITHOUT AURA WITHOUT STATUS MIGRAINOSUS, NOT INTRACTABLE: Primary | ICD-10-CM

## 2025-05-21 PROCEDURE — 99214 OFFICE O/P EST MOD 30 MIN: CPT

## 2025-05-21 RX ORDER — SUMATRIPTAN SUCCINATE 25 MG/1
25 TABLET ORAL
COMMUNITY
End: 2025-05-21

## 2025-05-21 RX ORDER — RIMEGEPANT SULFATE 75 MG/75MG
75 TABLET, ORALLY DISINTEGRATING ORAL EVERY OTHER DAY
Qty: 16 TABLET | Refills: 11 | Status: SHIPPED | OUTPATIENT
Start: 2025-05-21

## 2025-05-21 NOTE — PATIENT INSTRUCTIONS
"Headache/migraine treatment:   - When you have a moderate to severe headache, you should seek rest, initiate relaxation and apply cold compresses to the head.      Abortive medications (for immediate treatment of a headache):   It is ok to take ibuprofen, acetaminophen or naproxen (Advil, Tylenol,  Aleve, Excedrin) if they help your headaches you should limit these to no more than 3 times a week to avoid medication overuse/rebound headaches.      Discontinue Sumatriptan  Take Excedrin Tension headache at the onset of a migraine headache if not relieved within 30 minutes  Take Rizatriptan 10 mg, you may take an additional 10 mg in 2 hours if needed. Max 20 mg/day, max 3 days per week     Over the counter preventive supplements for headaches/migraines   (to take every day to help prevent headaches - not to take at the time of headache):  [x] Magnesium 500mg daily (If any diarrhea or upset stomach, decrease dose  as tolerated)  [x] Riboflavin (Vitamin B2) 400mg daily (FYI B2 may make your urine bright/neon yellow)     Prescription preventive medications for headaches/migraines   (to take every day to help prevent headaches - not to take at the time of headache):     Discontinue Aimovig  Instead start Nurtec 75 mg dissolve 1 tablet under the tongue once every other day for migraine prevention      *Typically these types of medications take time untill you see the benefit, although some may see improvement in days, often it may take weeks, especially if the medication is being titrated up to a beneficial level. Please contact us if there are any concerns or questions regarding the medication.      Self-Monitoring:  [x] Headache calendar. Each day angel a number from 0-10 indicating if there was a headache and how bad it was.  This can be used to monitor gradual improvement and is helpful to make medication adjustments. You can do this on paper or there is an JOANNE for a smart phone called \"Migraine e Diary\".      Lifestyle " Recommendations:  [x] SLEEP - Maintain a regular sleep schedule: Adults need at least 7-8 hours of uninterrupted a night. Maintain good sleep hygiene:  Going to bed and waking up at consistent times, avoiding excessive daytime naps, avoiding caffeinated beverages in the evening, avoid excessive stimulation in the evening and generally using bed primarily for sleeping.  One hour before bedtime would recommend turning lights down lower, decreasing your activity (may read quietly, listen to music at a low volume). When you get into bed, should eliminate all technology (no texting, emailing, playing with your phone, iPad or tablet in bed).  [x] HYDRATION - Maintain good hydration.  Drink  2L of fluid a day (4 typical small water bottles)  [x] DIET - Maintain good nutrition. In particular don't skip meals and try and eat healthy balanced meals regularly.  [x] TRIGGERS - Look for other triggers and avoid them: Limit caffeine to 1-2 cups a day or less. Avoid dietary triggers that you have noticed bring on your headaches (this could include aged cheese, peanuts, MSG, aspartame and nitrates).  [x] EXERCISE - physical exercise as we all know is good for you in many ways, and not only is good for your heart, but also is beneficial for your mental health, cognitive health and  chronic pain/headaches. I would encourage at the least 5 days of physical exercise weekly for at least 30 minutes.      Education and Follow-up  [x] Please call with any questions or concerns. Of course if any new concerning symptoms go to the emergency department.  [x] Follow up in 3 months, sooner if needed

## 2025-05-21 NOTE — TELEPHONE ENCOUNTER
----- Message from Gilson Estrada sent at 7/25/2024 12:54 PM CDT -----  Regarding: RE: OFEV  Agree. Lets do perfenidone.     Thanks,  Gilson  ----- Message -----  From: Lydia Amor, RN  Sent: 7/25/2024  11:29 AM CDT  To: Gilson Estrada MD; [facility]  Subject: WILBERKEMAL Riggins,    Pt having constant diarrhea, to the point that he cannot leave his house. He is also have sodium issues so his other doctors do not want him drinking a lot of water, but because of the severe diarrhea he is extra thirsty.     He does take Imodium and eats with the med but it does not help. They'd like to switch back to Pirfenidone. He has been on it before but had a bad skin reaction to the sun. He is now willing to avoid the sun vs deal with the continuous diarrhea.     I am going to re-order Pirfenidone for him.     Let me know if any other recs or concerns.     ThanksLydia   Kalyn Dawson called for additional info; asking how many headache days /month patient has; per chart review, 8-30 days; nothing further required, determination pending.    Office note documents: she described a baseline daily frontal pressure type headache, as well as a more intense headache a few times a week

## 2025-05-21 NOTE — TELEPHONE ENCOUNTER
Received a call from Eunice with [facility]. She requested recent OV notes for the [facility]. Faxed OV note from today to [facility] at fax # [phone]. EOC [id number]. Did note on fax cover sheet that pt tried and failed Aimovig and Emgality.

## 2025-05-21 NOTE — TELEPHONE ENCOUNTER
JENNI Alcantara    5/21/25 10:41 AM  Note     Please resubmit PA for Nurtec as she has now tried and failed Aimovig and Emgality.    Message addressed in 9/16/24 telephone encounter

## 2025-05-21 NOTE — TELEPHONE ENCOUNTER
Recd approval for [facility]  --Notified [facility]; Medication processed through successfully. Will be ready for tomorrow.   --Called and informed pt.

## 2025-05-21 NOTE — PROGRESS NOTES
Name: Caprice Salomon      : 1995      MRN: [id number]  Encounter Provider: JENNI Alcantara  Encounter Date: 2025   Encounter department: [facility]  :  Assessment & Plan  Chronic migraine without aura without status migrainosus, not intractable  Caprice Salomon is a 29 year old female with chronic migraine headaches with prior concern for potential IIH. LP did not reveal an elevated opening pressure and ophthalmology exam was benign and not concerning for papilledema, ruling the diagnosis of IIH out. Although she felt her migraines were improved while on Emgality she was unable to tolerate adverse effects. She has not been able to tolerate Aimovig since her last visit either. She is agreeable to discontinue Aimovig and instead trial Nurtec QOD. She will notify me of any adverse effects. In the past she found sumatriptan to be effective in aborting her migraines but caused sedation and jaw tightness, as an alternative she was prescribed rizatriptan however has not yet tried this. As such we will defer making any additional changes to her abortive regimen at this time.  She will follow up in 3 months; sooner if needed.    Orders:    rimegepant sulfate (Nurtec) 75 mg TBDP; Take 1 tablet (75 mg total) by mouth every other day       Patient Instructions   Headache/migraine treatment:   - When you have a moderate to severe headache, you should seek rest, initiate relaxation and apply cold compresses to the head.      Abortive medications (for immediate treatment of a headache):   It is ok to take ibuprofen, acetaminophen or naproxen (Advil, Tylenol,  Aleve, Excedrin) if they help your headaches you should limit these to no more than 3 times a week to avoid medication overuse/rebound headaches.      Discontinue Sumatriptan  Take Excedrin Tension headache at the onset of a migraine headache if not relieved within 30 minutes  Take Rizatriptan 10 mg, you may take an additional 10  "mg in 2 hours if needed. Max 20 mg/day, max 3 days per week     Over the counter preventive supplements for headaches/migraines   (to take every day to help prevent headaches - not to take at the time of headache):  [x] Magnesium 500mg daily (If any diarrhea or upset stomach, decrease dose  as tolerated)  [x] Riboflavin (Vitamin B2) 400mg daily (FYI B2 may make your urine bright/neon yellow)     Prescription preventive medications for headaches/migraines   (to take every day to help prevent headaches - not to take at the time of headache):     Discontinue Aimovig  Instead start Nurtec 75 mg dissolve 1 tablet under the tongue once every other day for migraine prevention      *Typically these types of medications take time untill you see the benefit, although some may see improvement in days, often it may take weeks, especially if the medication is being titrated up to a beneficial level. Please contact us if there are any concerns or questions regarding the medication.      Self-Monitoring:  [x] Headache calendar. Each day angel a number from 0-10 indicating if there was a headache and how bad it was.  This can be used to monitor gradual improvement and is helpful to make medication adjustments. You can do this on paper or there is an JOANNE for a smart phone called \"Migraine e Diary\".      Lifestyle Recommendations:  [x] SLEEP - Maintain a regular sleep schedule: Adults need at least 7-8 hours of uninterrupted a night. Maintain good sleep hygiene:  Going to bed and waking up at consistent times, avoiding excessive daytime naps, avoiding caffeinated beverages in the evening, avoid excessive stimulation in the evening and generally using bed primarily for sleeping.  One hour before bedtime would recommend turning lights down lower, decreasing your activity (may read quietly, listen to music at a low volume). When you get into bed, should eliminate all technology (no texting, emailing, playing with your phone, iPad or " tablet in bed).  [x] HYDRATION - Maintain good hydration.  Drink  2L of fluid a day (4 typical small water bottles)  [x] DIET - Maintain good nutrition. In particular don't skip meals and try and eat healthy balanced meals regularly.  [x] TRIGGERS - Look for other triggers and avoid them: Limit caffeine to 1-2 cups a day or less. Avoid dietary triggers that you have noticed bring on your headaches (this could include aged cheese, peanuts, MSG, aspartame and nitrates).  [x] EXERCISE - physical exercise as we all know is good for you in many ways, and not only is good for your heart, but also is beneficial for your mental health, cognitive health and  chronic pain/headaches. I would encourage at the least 5 days of physical exercise weekly for at least 30 minutes.      Education and Follow-up  [x] Please call with any questions or concerns. Of course if any new concerning symptoms go to the emergency department.  [x] Follow up in 3 months, sooner if needed     History of Present Illness     HPI Caprice Salomon is a 29 year old female who presents to the office to follow up on her chronic migraine headaches. In the past IIH was ruled out by Ophthalmologic exam with no evidence of papilledema and normal opening pressure on lumbar puncture. She was last seen 1/29/2025, she returns today with concerns about worsening migraines with nausea and dizziness a few hours-few days after administering her Aimovig injection. She has not yet tried Rizatriptan. In the past Qulipta was prescribed but was not approved by insurance due to required step therapy for Emgality, Aimovig, and then nurtec. She has not yet tried Nurtec.      Prior HPI:  She returns today in follow up and states she had to discontinue Emgality this month as it was causing her to have chest pain and tachycardia x 1 week after administration. She states she did not take her Emgality this month and she did not have any chest pain. She states she also was told her  "recent UTI was caused by Emgality. She also states whenever she uses sumatriptan it makes her extremely drowsy and caused jaw tightness. She does feel like while on Emgality her headaches were better and sumatriptan was effective in aborting her migraine as well. She just cannot tolerate the side effects of either medication. Her insurance requires she try Aimovig prior to approval for Nurtec or Qulipta (see denial scanned under media).     Prior HPI:  Caprice Mccauley is a 28 year old female with past medical history of migraine, chronic knee pain, dysmenorrhea on depo provera, tubal ligation (), who presents to establish care for migraine headaches. She was last seen by my colleague John Reid PA-C on 2024. At that time she described a baseline daily frontal pressure type headache, as well as a more intense headache a few times a week described as \"squeezing\" in the bilateral occipital areas that can radiate to bitemporal location. Associated symptoms included: photophobia/phonophobia/vomiting/blurred vision/and bilateral ear ringing. She had not tried many migraine preventatives except daily magnesium use. She stated a recent trial of toradol/reglan/benadryl didn't help much and caused anxiety (likely reglan reaction); fioricet also not effective in the past; mobic she only uses prn. Stress can be a trigger.  At the time of her last visit she was started on Topiramate, as well as sumatriptan as needed. She states she did not tolerate Topiramate as it caused her to have bad anxiety and paranoia. She was then trialed on Diamox and this caused her blurred vision and dizziness after just 3 doses. She was then subsequently started on a low dose of Propanolol 10 mg bid. She states this caused vomiting and blurred vision. She was then started on Venlafaxine 37.5 mg 24, she states she took this for 2 weeks but noticed prior to the next dose she had shakiness and felt as if she was \"crashing\" so she stopped " this.     She returns today and states she does use sumatriptan as needed but does this only very sparingly because the 2 times she used it she was excessively sedated. She states she currently still has the same frequency/severity/duration and associated symptoms as previously described. Since she was last seen she did complete further work up:     MRI Brain/MRA head and neck 9/9/2024  1.  No acute intracranial abnormality.  There is a partially empty sella as well as tortuosity of the optic nerves with increased fluid in the optic nerve sheaths.  These may be incidental findings but may also be seen in the setting of idiopathic intracranial hypertension.  Consider ophthalmological funduscopic examination.   2.  MRA of the neck demonstrates no significant stenosis of the carotid or vertebral arteries.   3.  MRA of the head demonstrates no evidence of aneurysm, significant intracranial stenosis, large vessel cut off, or AVM.      She reports vision loss intermittently sometimes for several days up to twice monthly. She also has pressure in her head. She does see Ophthalmology 9/20/24 [place]. She has never seen Ophthalmology prior and as such denies any prior dx of papilledema. Current BMI 37.04. She states her current weight is the heaviest she has ever been. She does note headaches started 7 years ago after gaining weight during her first pregnancy. She is on the Depo-Provera as contraception.     Review of Systems   Constitutional:  Negative for appetite change, fatigue and fever.   HENT: Negative.  Negative for hearing loss, tinnitus, trouble swallowing and voice change.    Eyes: Negative.  Negative for photophobia, pain and visual disturbance.   Respiratory: Negative.  Negative for shortness of breath.    Cardiovascular: Negative.  Negative for palpitations.   Gastrointestinal: Negative.  Negative for nausea and vomiting.   Endocrine: Negative.  Negative for cold intolerance.   Genitourinary: Negative.   "Negative for dysuria, frequency and urgency.   Musculoskeletal:  Negative for back pain, gait problem, myalgias, neck pain and neck stiffness.   Skin: Negative.  Negative for rash.   Allergic/Immunologic: Negative.    Neurological:  Negative for dizziness, tremors, seizures, syncope, facial asymmetry, speech difficulty, weakness, light-headedness, numbness and headaches.   Hematological: Negative.  Does not bruise/bleed easily.   Psychiatric/Behavioral: Negative.  Negative for confusion, hallucinations and sleep disturbance.    All other systems reviewed and are negative.    I have personally reviewed the MA's review of systems and made changes as necessary.    Medications Ordered Prior to Encounter[1]      Objective   /84 (BP Location: Right arm, Patient Position: Sitting, Cuff Size: Standard)   Pulse 62   Ht 5' 4\" (1.626 m)   Wt 108 kg (237 lb 6.4 oz)   SpO2 99%   BMI 40.75 kg/m²     Neurological Exam  On neurological examination patient is alert, awake, oriented and in no distress. Speech is fluent without dysarthria or aphasia.She is able to rise easily without assistance from a seated position. Casual gait is normal including stance, stride, and arm swing.      Administrative Statements   I have spent a total time of 20 minutes in caring for this patient on the day of the visit/encounter including Prognosis, Risks and benefits of tx options, Instructions for management, Patient and family education, Importance of tx compliance, Risk factor reductions, Impressions, Counseling / Coordination of care, Documenting in the medical record, Reviewing/placing orders in the medical record (including tests, medications, and/or procedures), and Obtaining or reviewing history  .       [1]   Current Outpatient Medications on File Prior to Visit   Medication Sig Dispense Refill    acetaminophen (TYLENOL) 500 mg tablet Take 2 tablets (1,000 mg total) by mouth every 8 (eight) hours 40 tablet 0    Magnesium Gluconate " (MAGNESIUM 27 PO) Take by mouth      meloxicam (MOBIC) 7.5 mg tablet Take 7.5 mg by mouth      rizatriptan (MAXALT) 10 mg tablet Take 1 tablet (10 mg total) by mouth as needed for migraine May repeat once in 2 hours if needed.  Limit 3 a week or 12 a month 12 tablet 3    [DISCONTINUED] SUMAtriptan (IMITREX) 25 mg tablet Take 25 mg by mouth      [DISCONTINUED] Erenumab-aooe 140 MG/ML SOAJ Inject 140 mg under the skin every 30 (thirty) days (Patient not taking: Reported on 5/21/2025) 1 mL 11    [DISCONTINUED] medroxyPROGESTERone (DEPO-PROVERA) 150 mg/mL injection Inject 150 mg into a muscle every 3 (three) months (Patient not taking: Reported on 10/23/2024)       No current facility-administered medications on file prior to visit.

## 2025-05-21 NOTE — TELEPHONE ENCOUNTER
Submitted PA Nurtec via [facility] promptpa.    Prior Auth (EOC) ID: [id number]  Member ID#: [id number]

## 2025-05-23 ENCOUNTER — HOSPITAL ENCOUNTER (EMERGENCY)
Facility: HOSPITAL | Age: 30
Discharge: HOME | End: 2025-05-23
Attending: EMERGENCY MEDICINE
Payer: COMMERCIAL

## 2025-05-23 VITALS
OXYGEN SATURATION: 100 % | SYSTOLIC BLOOD PRESSURE: 98 MMHG | HEART RATE: 76 BPM | RESPIRATION RATE: 18 BRPM | DIASTOLIC BLOOD PRESSURE: 53 MMHG | TEMPERATURE: 98 F

## 2025-05-23 DIAGNOSIS — E86.0 DEHYDRATION: ICD-10-CM

## 2025-05-23 DIAGNOSIS — R11.2 NAUSEA & VOMITING: ICD-10-CM

## 2025-05-23 DIAGNOSIS — R25.2 CRAMPING OF HANDS: ICD-10-CM

## 2025-05-23 DIAGNOSIS — Z86.39 HISTORY OF ELEVATED GLUCOSE: Primary | ICD-10-CM

## 2025-05-23 DIAGNOSIS — R35.0 INCREASED URINARY FREQUENCY: ICD-10-CM

## 2025-05-23 LAB
ALBUMIN SERPL BCG-MCNC: 4.1 G/DL (ref 3.5–5)
ALP SERPL-CCNC: 84 U/L (ref 34–104)
ALT SERPL W P-5'-P-CCNC: 18 U/L (ref 7–52)
ANION GAP SERPL CALCULATED.3IONS-SCNC: 7 MMOL/L (ref 4–13)
AST SERPL W P-5'-P-CCNC: 19 U/L (ref 13–39)
BASOPHILS # BLD AUTO: 0.07 THOUSANDS/ÂΜL (ref 0–0.1)
BASOPHILS NFR BLD AUTO: 1 % (ref 0–1)
BILIRUB SERPL-MCNC: 0.34 MG/DL (ref 0.2–1)
BUN SERPL-MCNC: 12 MG/DL (ref 5–25)
CALCIUM SERPL-MCNC: 8.8 MG/DL (ref 8.4–10.2)
CHLORIDE SERPL-SCNC: 106 MMOL/L (ref 96–108)
CHOLEST SERPL-MCNC: 159 MG/DL (ref ?–200)
CO2 SERPL-SCNC: 26 MMOL/L (ref 21–32)
CREAT SERPL-MCNC: 0.81 MG/DL (ref 0.6–1.3)
EOSINOPHIL # BLD AUTO: 0.12 THOUSAND/ÂΜL (ref 0–0.61)
EOSINOPHIL NFR BLD AUTO: 1 % (ref 0–6)
ERYTHROCYTE [DISTWIDTH] IN BLOOD BY AUTOMATED COUNT: 12.3 % (ref 11.6–15.1)
EST. AVERAGE GLUCOSE BLD GHB EST-MCNC: 117 MG/DL
GFR SERPL CREATININE-BSD FRML MDRD: 99 ML/MIN/1.73SQ M
GLUCOSE SERPL-MCNC: 91 MG/DL (ref 65–140)
GLUCOSE SERPL-MCNC: 96 MG/DL (ref 65–140)
HBA1C MFR BLD: 5.7 %
HCG SERPL QL: NEGATIVE
HCT VFR BLD AUTO: 41 % (ref 34.8–46.1)
HDLC SERPL-MCNC: 36 MG/DL
HGB BLD-MCNC: 12.8 G/DL (ref 11.5–15.4)
IMM GRANULOCYTES # BLD AUTO: 0.03 THOUSAND/UL (ref 0–0.2)
IMM GRANULOCYTES NFR BLD AUTO: 0 % (ref 0–2)
LDLC SERPL CALC-MCNC: 83 MG/DL (ref 0–100)
LYMPHOCYTES # BLD AUTO: 2.41 THOUSANDS/ÂΜL (ref 0.6–4.47)
LYMPHOCYTES NFR BLD AUTO: 28 % (ref 14–44)
MCH RBC QN AUTO: 28 PG (ref 26.8–34.3)
MCHC RBC AUTO-ENTMCNC: 31.2 G/DL (ref 31.4–37.4)
MCV RBC AUTO: 90 FL (ref 82–98)
MONOCYTES # BLD AUTO: 0.51 THOUSAND/ÂΜL (ref 0.17–1.22)
MONOCYTES NFR BLD AUTO: 6 % (ref 4–12)
NEUTROPHILS # BLD AUTO: 5.63 THOUSANDS/ÂΜL (ref 1.85–7.62)
NEUTS SEG NFR BLD AUTO: 64 % (ref 43–75)
NRBC BLD AUTO-RTO: 0 /100 WBCS
PLATELET # BLD AUTO: 366 THOUSANDS/UL (ref 149–390)
PMV BLD AUTO: 10.1 FL (ref 8.9–12.7)
POTASSIUM SERPL-SCNC: 3.9 MMOL/L (ref 3.5–5.3)
PROT SERPL-MCNC: 7.1 G/DL (ref 6.4–8.4)
RBC # BLD AUTO: 4.57 MILLION/UL (ref 3.81–5.12)
SODIUM SERPL-SCNC: 139 MMOL/L (ref 135–147)
TRIGL SERPL-MCNC: 201 MG/DL (ref ?–150)
WBC # BLD AUTO: 8.77 THOUSAND/UL (ref 4.31–10.16)

## 2025-05-23 PROCEDURE — 99284 EMERGENCY DEPT VISIT MOD MDM: CPT | Performed by: EMERGENCY MEDICINE

## 2025-05-23 PROCEDURE — 36415 COLL VENOUS BLD VENIPUNCTURE: CPT | Performed by: EMERGENCY MEDICINE

## 2025-05-23 PROCEDURE — 96361 HYDRATE IV INFUSION ADD-ON: CPT

## 2025-05-23 PROCEDURE — 82948 REAGENT STRIP/BLOOD GLUCOSE: CPT

## 2025-05-23 PROCEDURE — 80061 LIPID PANEL: CPT | Performed by: EMERGENCY MEDICINE

## 2025-05-23 PROCEDURE — 83036 HEMOGLOBIN GLYCOSYLATED A1C: CPT | Performed by: EMERGENCY MEDICINE

## 2025-05-23 PROCEDURE — 85025 COMPLETE CBC W/AUTO DIFF WBC: CPT | Performed by: EMERGENCY MEDICINE

## 2025-05-23 PROCEDURE — 99284 EMERGENCY DEPT VISIT MOD MDM: CPT

## 2025-05-23 PROCEDURE — 80053 COMPREHEN METABOLIC PANEL: CPT | Performed by: EMERGENCY MEDICINE

## 2025-05-23 PROCEDURE — 96374 THER/PROPH/DIAG INJ IV PUSH: CPT

## 2025-05-23 PROCEDURE — 96375 TX/PRO/DX INJ NEW DRUG ADDON: CPT

## 2025-05-23 PROCEDURE — 84703 CHORIONIC GONADOTROPIN ASSAY: CPT | Performed by: EMERGENCY MEDICINE

## 2025-05-23 RX ORDER — ONDANSETRON 2 MG/ML
4 INJECTION INTRAMUSCULAR; INTRAVENOUS ONCE
Status: COMPLETED | OUTPATIENT
Start: 2025-05-23 | End: 2025-05-23

## 2025-05-23 RX ORDER — KETOROLAC TROMETHAMINE 30 MG/ML
15 INJECTION, SOLUTION INTRAMUSCULAR; INTRAVENOUS ONCE
Status: COMPLETED | OUTPATIENT
Start: 2025-05-23 | End: 2025-05-23

## 2025-05-23 RX ADMIN — SODIUM CHLORIDE 1000 ML: 0.9 INJECTION, SOLUTION INTRAVENOUS at 13:00

## 2025-05-23 RX ADMIN — ONDANSETRON 4 MG: 2 INJECTION INTRAMUSCULAR; INTRAVENOUS at 13:01

## 2025-05-23 RX ADMIN — KETOROLAC TROMETHAMINE 15 MG: 30 INJECTION, SOLUTION INTRAMUSCULAR at 13:01

## 2025-05-23 NOTE — Clinical Note
Caprice Salomon was seen and treated in our emergency department on 5/23/2025.                Diagnosis: elevated glucose    Caprice  may return to work on return date.    She may return on this date: 05/24/2025         If you have any questions or concerns, please don't hesitate to call.      Romario Monroe MD    ______________________________           _______________          _______________  Hospital Representative                              Date                                Time

## 2025-05-23 NOTE — ED PROVIDER NOTES
Time reflects when diagnosis was documented in both MDM as applicable and the Disposition within this note       Time User Action Codes Description Comment    5/23/2025 12:57 PM Erne, Romario Add [R25.2] Cramping of hands     5/23/2025 12:57 PM Erne, Romario Add [R35.0] Increased urinary frequency     5/23/2025 12:59 PM Erne, Romario Add [E86.0] Dehydration     5/23/2025 12:59 PM Erne, Romario Add [R11.2] Nausea & vomiting     5/23/2025 12:59 PM Erne, Romario Add [R73.09] Elevated glucose     5/23/2025 12:59 PM Erne, Romario Remove [R73.09] Elevated glucose     5/23/2025 12:59 PM Erne, Romario Add [Z86.39] History of elevated glucose     5/23/2025 12:59 PM Erne, Romario Modify [R25.2] Cramping of hands     5/23/2025 12:59 PM Erne, Romario Modify [Z86.39] History of elevated glucose           ED Disposition       ED Disposition   Discharge    Condition   Stable    Date/Time   Fri May 23, 2025  1:41 PM    Comment   Caprice Umm discharge to home/self care.                   Assessment & Plan       Medical Decision Making  29-year-old female no significant reported past history presenting with concern for high sugar.  Plan for basic labs.  A1c.  Symptom management with IV pain and nausea medication plus fluids.  Reassess.    Labs interpreted by me without significant acute process.  Symptoms improving.  Work note. Discussed results and recommendations. Advised follow up PCP. Medication recommendations. Given instructions and return precautions. Patient/family at bedside acknowledged understanding of all written and verbal instructions and return precautions. Discharged.     Amount and/or Complexity of Data Reviewed  Labs: ordered.    Risk  Prescription drug management.             Medications   sodium chloride 0.9 % bolus 1,000 mL (0 mL Intravenous Stopped 5/23/25 1400)   ondansetron (ZOFRAN) injection 4 mg (4 mg Intravenous Given 5/23/25 1301)   ketorolac (TORADOL) injection 15 mg (15 mg Intravenous Given 5/23/25 1301)       ED Risk  Strat Scores                    No data recorded        SBIRT 20yo+      Flowsheet Row Most Recent Value   Initial Alcohol Screen: US AUDIT-C     1. How often do you have a drink containing alcohol? 0 Filed at: 05/23/2025 2978   2. How many drinks containing alcohol do you have on a typical day you are drinking?  0 Filed at: 05/23/2025 4074   3b. FEMALE Any Age, or MALE 65+: How often do you have 4 or more drinks on one occassion? 0 Filed at: 05/23/2025 7035   Audit-C Score 0 Filed at: 05/23/2025 5963   RACQUEL: How many times in the past year have you...    Used an illegal drug or used a prescription medication for non-medical reasons? Never Filed at: 05/23/2025 7604                            History of Present Illness       Chief Complaint   Patient presents with    Hyperglycemia - Symptomatic     Pt states feeling weak and lightheaded starting this morning, checked glucose it was 200's per pt, no hx of diabetes but has hx of gestational        Past Medical History[1]   Past Surgical History[2]   Family History[3]   Social History[4]   E-Cigarette/Vaping    E-Cigarette Use Never User       E-Cigarette/Vaping Substances    Nicotine No     THC No     CBD No     Flavoring No     Other No     Unknown No       I have reviewed and agree with the history as documented.     29-year-old female no significant reported past history presenting with concern for high sugar.  Patient reports that she was feeling unwell and her hands started cramping as well as other muscular areas of her arms and legs.  She then had 1 episode of nausea and nonbloody nonbilious emesis.  Has been having increased urination as well.  She decided to check her sugar because it felt similar to when she had gestational diabetes.  Patient reports that her fingerstick sugar was in the 300s.  No known history of diabetes.  Status post tubal ligation.  Denies any other complaints.  Chart reviewed.    Past Medical History:  No date: GERD (gastroesophageal  reflux disease)  No date: Left knee pain      Comment:  arthroscopy today 10/3/2023  No date: Migraine  No date: Migraines  No date: Wears glasses  Family History: non-contributory  Social History          Review of Systems   Constitutional:  Negative for appetite change, chills, diaphoresis, fever and unexpected weight change.   HENT:  Negative for congestion and rhinorrhea.    Eyes:  Negative for photophobia and visual disturbance.   Respiratory:  Negative for cough, chest tightness and shortness of breath.    Cardiovascular:  Negative for chest pain, palpitations and leg swelling.   Gastrointestinal:  Positive for nausea and vomiting. Negative for abdominal distention, abdominal pain, blood in stool, constipation and diarrhea.   Genitourinary:  Negative for dysuria and hematuria.   Musculoskeletal:  Positive for myalgias. Negative for back pain, joint swelling, neck pain and neck stiffness.   Skin:  Negative for color change, pallor, rash and wound.   Neurological:  Negative for dizziness, syncope, weakness, light-headedness and headaches.   Psychiatric/Behavioral:  Negative for agitation.    All other systems reviewed and are negative.          Objective       ED Triage Vitals   Temperature Pulse Blood Pressure Respirations SpO2 Patient Position - Orthostatic VS   05/23/25 1231 05/23/25 1231 05/23/25 1231 05/23/25 1231 05/23/25 1231 05/23/25 1231   98 °F (36.7 °C) 95 130/82 18 100 % Sitting      Temp Source Heart Rate Source BP Location FiO2 (%) Pain Score    05/23/25 1231 05/23/25 1238 05/23/25 1231 -- 05/23/25 1300    Temporal Monitor Left arm  7      Vitals      Date and Time Temp Pulse SpO2 Resp BP Pain Score FACES Pain Rating User   05/23/25 1400 -- 76 100 % 18 98/53 -- -- NW   05/23/25 1330 -- 77 99 % 18 106/56 -- -- NW   05/23/25 1300 -- -- -- -- -- 7 --    05/23/25 1231 98 °F (36.7 °C) 95 100 % 18 130/82 -- -- KW            Physical Exam  Vitals and nursing note reviewed.   Constitutional:        General: She is not in acute distress.     Appearance: Normal appearance. She is well-developed. She is not ill-appearing, toxic-appearing or diaphoretic.   HENT:      Head: Normocephalic and atraumatic.      Nose: Nose normal. No congestion or rhinorrhea.      Mouth/Throat:      Mouth: Mucous membranes are moist.      Pharynx: Oropharynx is clear. No oropharyngeal exudate or posterior oropharyngeal erythema.     Eyes:      General: No scleral icterus.        Right eye: No discharge.         Left eye: No discharge.      Extraocular Movements: Extraocular movements intact.      Conjunctiva/sclera: Conjunctivae normal.      Pupils: Pupils are equal, round, and reactive to light.     Neck:      Vascular: No JVD.      Trachea: No tracheal deviation.      Comments: Supple. Normal range of motion.   Cardiovascular:      Rate and Rhythm: Normal rate and regular rhythm.      Heart sounds: Normal heart sounds. No murmur heard.     No friction rub. No gallop.      Comments: Normal rate and regular rhythm  Pulmonary:      Effort: Pulmonary effort is normal. No respiratory distress.      Breath sounds: Normal breath sounds. No stridor. No wheezing or rales.      Comments: Clear to auscultation bilaterally  Chest:      Chest wall: No tenderness.   Abdominal:      General: Bowel sounds are normal. There is no distension.      Palpations: Abdomen is soft.      Tenderness: There is no abdominal tenderness. There is no right CVA tenderness, left CVA tenderness, guarding or rebound.      Comments: Soft, nontender, nondistended.  Normal bowel sounds throughout     Musculoskeletal:         General: No swelling, tenderness, deformity or signs of injury. Normal range of motion.      Cervical back: Normal range of motion and neck supple. No rigidity. No muscular tenderness.      Right lower leg: No edema.      Left lower leg: No edema.   Lymphadenopathy:      Cervical: No cervical adenopathy.     Skin:     General: Skin is warm and dry.       Coloration: Skin is not pale.      Findings: No erythema or rash.     Neurological:      General: No focal deficit present.      Mental Status: She is alert. Mental status is at baseline.      Sensory: No sensory deficit.      Motor: No weakness or abnormal muscle tone.      Coordination: Coordination normal.      Gait: Gait normal.      Comments: Alert.  Strength and sensation grossly intact.  Ambulatory without difficulty at baseline.    Psychiatric:         Behavior: Behavior normal.         Thought Content: Thought content normal.         Results Reviewed       Procedure Component Value Units Date/Time    hCG, qualitative pregnancy [[id number]]  (Normal) Collected: 05/23/25 1306    Lab Status: Final result Specimen: Blood from Arm, Right Updated: 05/23/25 1335     Preg, Serum Negative    Comprehensive metabolic panel [[id number]] Collected: 05/23/25 1258    Lab Status: Final result Specimen: Blood from Arm, Right Updated: 05/23/25 1329     Sodium 139 mmol/L      Potassium 3.9 mmol/L      Chloride 106 mmol/L      CO2 26 mmol/L      ANION GAP 7 mmol/L      BUN 12 mg/dL      Creatinine 0.81 mg/dL      Glucose 96 mg/dL      Calcium 8.8 mg/dL      AST 19 U/L      ALT 18 U/L      Alkaline Phosphatase 84 U/L      Total Protein 7.1 g/dL      Albumin 4.1 g/dL      Total Bilirubin 0.34 mg/dL      eGFR 99 ml/min/1.73sq m     Narrative:      [employer] guidelines for Chronic Kidney Disease (CKD):     Stage 1 with normal or high GFR (GFR > 90 mL/min/1.73 square meters)    Stage 2 Mild CKD (GFR = 60-89 mL/min/1.73 square meters)    Stage 3A Moderate CKD (GFR = 45-59 mL/min/1.73 square meters)    Stage 3B Moderate CKD (GFR = 30-44 mL/min/1.73 square meters)    Stage 4 Severe CKD (GFR = 15-29 mL/min/1.73 square meters)    Stage 5 End Stage CKD (GFR <15 mL/min/1.73 square meters)  Note: GFR calculation is accurate only with a steady state creatinine    Lipid Panel with Direct LDL reflex [[id number]]   (Abnormal) Collected: 05/23/25 1258    Lab Status: Final result Specimen: Blood from Arm, Right Updated: 05/23/25 1329     Cholesterol 159 mg/dL      Triglycerides 201 mg/dL      HDL, Direct 36 mg/dL      LDL Calculated 83 mg/dL     CBC and differential [[id number]]  (Abnormal) Collected: 05/23/25 1258    Lab Status: Final result Specimen: Blood from Arm, Right Updated: 05/23/25 1314     WBC 8.77 Thousand/uL      RBC 4.57 Million/uL      Hemoglobin 12.8 g/dL      Hematocrit 41.0 %      MCV 90 fL      MCH 28.0 pg      MCHC 31.2 g/dL      RDW 12.3 %      MPV 10.1 fL      Platelets 366 Thousands/uL      nRBC 0 /100 WBCs      Segmented % 64 %      Immature Grans % 0 %      Lymphocytes % 28 %      Monocytes % 6 %      Eosinophils Relative 1 %      Basophils Relative 1 %      Absolute Neutrophils 5.63 Thousands/µL      Absolute Immature Grans 0.03 Thousand/uL      Absolute Lymphocytes 2.41 Thousands/µL      Absolute Monocytes 0.51 Thousand/µL      Eosinophils Absolute 0.12 Thousand/µL      Basophils Absolute 0.07 Thousands/µL     Hemoglobin A1C [[id number]] Collected: 05/23/25 1258    Lab Status: In process Specimen: Blood from Arm, Right Updated: 05/23/25 1310    Fingerstick Glucose (POCT) [[id number]]  (Normal) Collected: 05/23/25 1233    Lab Status: Final result Specimen: Blood Updated: 05/23/25 1235     POC Glucose 91 mg/dl             No orders to display       Procedures    ED Medication and Procedure Management   Prior to Admission Medications   Prescriptions Last Dose Informant Patient Reported? Taking?   Magnesium Gluconate (MAGNESIUM 27 PO)  Self Yes No   Sig: Take by mouth   acetaminophen (TYLENOL) 500 mg tablet  Self No No   Sig: Take 2 tablets (1,000 mg total) by mouth every 8 (eight) hours   meloxicam (MOBIC) 7.5 mg tablet  Self Yes No   Sig: Take 7.5 mg by mouth   rimegepant sulfate (Nurtec) 75 mg TBDP   No No   Sig: Take 1 tablet (75 mg total) by mouth every other day   rizatriptan (MAXALT) 10 mg tablet  Self  No No   Sig: Take 1 tablet (10 mg total) by mouth as needed for migraine May repeat once in 2 hours if needed.  Limit 3 a week or 12 a month      Facility-Administered Medications: None     Discharge Medication List as of 5/23/2025  1:41 PM        CONTINUE these medications which have NOT CHANGED    Details   acetaminophen (TYLENOL) 500 mg tablet Take 2 tablets (1,000 mg total) by mouth every 8 (eight) hours, Starting Thu 8/24/2023, Normal      Magnesium Gluconate (MAGNESIUM 27 PO) Take by mouth, Historical Med      meloxicam (MOBIC) 7.5 mg tablet Take 7.5 mg by mouth, Starting Fri 11/3/2023, Historical Med      rimegepant sulfate (Nurtec) 75 mg TBDP Take 1 tablet (75 mg total) by mouth every other day, Starting Wed 5/21/2025, Normal      rizatriptan (MAXALT) 10 mg tablet Take 1 tablet (10 mg total) by mouth as needed for migraine May repeat once in 2 hours if needed.  Limit 3 a week or 12 a month, Starting Wed 1/29/2025, Normal           No discharge procedures on file.  ED SEPSIS DOCUMENTATION   Time reflects when diagnosis was documented in both MDM as applicable and the Disposition within this note       Time User Action Codes Description Comment    5/23/2025 12:57 PM Erne, Romario Add [R25.2] Cramping of hands     5/23/2025 12:57 PM Erne, Romario Add [R35.0] Increased urinary frequency     5/23/2025 12:59 PM Erne, Romario Add [E86.0] Dehydration     5/23/2025 12:59 PM Erne, Romario Add [R11.2] Nausea & vomiting     5/23/2025 12:59 PM Erne, Romario Add [R73.09] Elevated glucose     5/23/2025 12:59 PM Erne, Romario Remove [R73.09] Elevated glucose     5/23/2025 12:59 PM Erne, Romario Add [Z86.39] History of elevated glucose     5/23/2025 12:59 PM Erne, Romario Modify [R25.2] Cramping of hands     5/23/2025 12:59 PM Erne, Romario Modify [Z86.39] History of elevated glucose                      [1]   Past Medical History:  Diagnosis Date    GERD (gastroesophageal reflux disease)     Left knee pain     arthroscopy today 10/3/2023     Migraine     Migraines     Wears glasses    [2]   Past Surgical History:  Procedure Laterality Date    FL LUMBAR PUNCTURE DIAGNOSTIC  9/27/2024    OVARIAN CYST REMOVAL      AK ARTHROSCOPY KNEE REMOVAL LOOSE/FOREIGN BODY Left 10/3/2023    Procedure: ARTHROSCOPY KNEE, loose body removal;  Surgeon: Zeus Harrell MD;  Location: [facility];  Service: Orthopedics    TUBAL LIGATION     [3]   Family History  Problem Relation Name Age of Onset    Diabetes Father      Leukemia Brother      Diabetes Maternal Grandmother      Cancer Maternal Grandfather      Lung cancer Maternal Grandfather      Breast cancer Maternal Aunt     [4]   Social History  Tobacco Use    Smoking status: Never    Smokeless tobacco: Never   Vaping Use    Vaping status: Never Used   Substance Use Topics    Alcohol use: No    Drug use: No        Romario Monroe MD  05/23/25 1393

## 2025-05-29 ENCOUNTER — HOSPITAL ENCOUNTER (EMERGENCY)
Facility: HOSPITAL | Age: 30
Discharge: HOME | End: 2025-05-29
Payer: OTHER MISCELLANEOUS

## 2025-05-29 VITALS
DIASTOLIC BLOOD PRESSURE: 77 MMHG | SYSTOLIC BLOOD PRESSURE: 135 MMHG | RESPIRATION RATE: 19 BRPM | HEART RATE: 84 BPM | OXYGEN SATURATION: 100 % | TEMPERATURE: 97.9 F

## 2025-05-29 DIAGNOSIS — Z77.21 EXPOSURE TO BLOOD OR BODY FLUID: Primary | ICD-10-CM

## 2025-05-29 LAB
ALT SERPL W P-5'-P-CCNC: 18 U/L (ref 7–52)
HBV SURFACE AB SER-ACNC: <3 MIU/ML
HBV SURFACE AG SER QL: NORMAL
HCV AB SER QL: NORMAL
HIV 1+2 AB+HIV1 P24 AG SERPL QL IA: NORMAL

## 2025-05-29 PROCEDURE — 99284 EMERGENCY DEPT VISIT MOD MDM: CPT

## 2025-05-29 PROCEDURE — 36415 COLL VENOUS BLD VENIPUNCTURE: CPT

## 2025-05-29 PROCEDURE — 87340 HEPATITIS B SURFACE AG IA: CPT

## 2025-05-29 PROCEDURE — 86704 HEP B CORE ANTIBODY TOTAL: CPT

## 2025-05-29 PROCEDURE — 99282 EMERGENCY DEPT VISIT SF MDM: CPT

## 2025-05-29 PROCEDURE — 84460 ALANINE AMINO (ALT) (SGPT): CPT

## 2025-05-29 PROCEDURE — 86803 HEPATITIS C AB TEST: CPT

## 2025-05-29 PROCEDURE — 87389 HIV-1 AG W/HIV-1&-2 AB AG IA: CPT

## 2025-05-29 PROCEDURE — 86706 HEP B SURFACE ANTIBODY: CPT

## 2025-05-29 NOTE — ED PROVIDER NOTES
Emergency Department Employee Health Note  Caprice Salomon 29 y.o. female MRN: [id number]  Unit/Bed#: ED 26/ED 26 Encounter: [id number]        History of Present Illness     Chief Complaint:   Chief Complaint   Patient presents with    Body Fluid Exposure     States a pts blood splashed over her face lips at work.      HPI:  Caprice Salomon is a 29 y.o. female who presents with body fluid exposure. 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 She withdrew the needle and went to put the safety on the needle, but the safety tip malfunctioned and hit the needle, causing blood in the tip of the needle to fly outward and patient was sprayed in the face. Patient states that there were drops of blood on her forehead and lips. Patient states that the blood did not go into her eyes or mouth. Patient immediately washed her face thoroughly. Patient states that she did not stick herself with the needle at any point. Patient advised to present to the ED by her manager and employee health.    Mechanism:           HPI  Review of Systems   All other systems reviewed and are negative.      Historical Information     Immunizations:   Immunization History   Administered Date(s) Administered    HPV9 02/07/2020, 10/21/2022, 11/03/2023    INFLUENZA 02/07/2020, 09/16/2020, 10/25/2021, 10/21/2022, 11/03/2023, 11/03/2023    MMR 04/15/2024    Tdap 02/27/2017, 01/24/2022    Varicella 04/15/2024, 06/03/2024       Past Medical History[1]  Family History[2]  Past Surgical History[3]  Social History[4]  E-Cigarette/Vaping    E-Cigarette Use Never User      E-Cigarette/Vaping Substances    Nicotine No     THC No     CBD No     Flavoring No     Other No     Unknown No          Meds/Allergies   Prior to Admission Medications   Prescriptions Last Dose Informant Patient Reported? Taking?   Magnesium Gluconate (MAGNESIUM 27 PO)  Self Yes No   Sig: Take by mouth   acetaminophen (TYLENOL) 500 mg tablet  Self No No   Sig: Take 2 tablets  (1,000 mg total) by mouth every 8 (eight) hours   meloxicam (MOBIC) 7.5 mg tablet  Self Yes No   Sig: Take 7.5 mg by mouth   rimegepant sulfate (Nurtec) 75 mg TBDP   No No   Sig: Take 1 tablet (75 mg total) by mouth every other day   rizatriptan (MAXALT) 10 mg tablet  Self No No   Sig: Take 1 tablet (10 mg total) by mouth as needed for migraine May repeat once in 2 hours if needed.  Limit 3 a week or 12 a month      Facility-Administered Medications: None       Allergies[5]    PHYSICAL EXAM  Physical Exam  Vitals and nursing note reviewed.   Constitutional:       General: She is not in acute distress.     Appearance: Normal appearance. She is well-developed and normal weight. She is not ill-appearing or toxic-appearing.   HENT:      Head: Normocephalic and atraumatic.      Right Ear: External ear normal.      Left Ear: External ear normal.      Nose: Nose normal.      Mouth/Throat:      Mouth: Mucous membranes are moist.     Eyes:      Conjunctiva/sclera: Conjunctivae normal.       Cardiovascular:      Rate and Rhythm: Normal rate and regular rhythm.   Pulmonary:      Effort: Pulmonary effort is normal. No respiratory distress.   Abdominal:      Palpations: Abdomen is soft.     Musculoskeletal:         General: Normal range of motion.      Cervical back: Neck supple.     Skin:     General: Skin is warm and dry.      Capillary Refill: Capillary refill takes less than 2 seconds.     Neurological:      General: No focal deficit present.      Mental Status: She is alert and oriented to person, place, and time.     Psychiatric:         Mood and Affect: Mood normal.         Vital Signs  ED Triage Vitals [05/29/25 0957]   Temperature Pulse Respirations Blood Pressure SpO2   97.9 °F (36.6 °C) 84 19 135/77 100 %      Temp Source Heart Rate Source Patient Position - Orthostatic VS BP Location FiO2 (%)   Temporal Monitor Sitting Left arm --      Pain Score       --           Vitals:    05/29/25 0957   BP: 135/77   Pulse: 84    Patient Position - Orthostatic VS: Sitting         Certification of Exposure:    (link to Employee Health Services: [facility] ([email or web address]): find link to BBP Exposure Instructions under important links on center of the page)    The patient is stable and has a history and physical exam consistent with an Blood Exposure and based on my assessment this exposure is Nonsignificant      Visual Acuity      ED Medications  Medications - No data to display    Diagnostic Studies  Results Reviewed       Procedure Component Value Units Date/Time    ALT [[id number]]  (Normal) Collected: 05/29/25 1025    Lab Status: Final result Specimen: Blood from Arm, Left Updated: 05/29/25 1051     ALT 18 U/L     HIV 1/2 AG/AB w Reflex [facility] for 2 yr old and above [[id number]] Collected: 05/29/25 1025    Lab Status: In process Specimen: Blood from Arm, Left Updated: 05/29/25 1028    Hepatitis C antibody [[id number]] Collected: 05/29/25 1025    Lab Status: In process Specimen: Blood from Arm, Left Updated: 05/29/25 1028    Hepatitis B surface antibody [[id number]] Collected: 05/29/25 1025    Lab Status: In process Specimen: Blood from Arm, Left Updated: 05/29/25 1028    Hepatitis B surface antigen [[id number]] Collected: 05/29/25 1025    Lab Status: In process Specimen: Blood from Arm, Left Updated: 05/29/25 1028               No orders to display              Procedures  Procedures         Medical Decision Making  29 year old female presenting to the ED for evaluation after body fluid exposure. Hx and clinical exam documented below. Exposure nonsignificant; no percutaneous exposure. Patient thoroughly washed her face. Discussed with patient that risk of transmission very low and with shared decision making, patient defers post exposure prophylaxis at this time. Baseline exposure panel drawn. See ED course for details regarding source patient testing. Patient instructed to follow up with [facility].    I gave oral  return precautions for what to return for in addition to the written return precautions.   The patient verbalized understanding of the discharge instructions and warnings that would necessitate return to the Emergency Department.  I specifically highlighted areas of special concern regarding the written and verbal discharge instructions and return precautions.    All questions were answered prior to discharge.      Amount and/or Complexity of Data Reviewed  Labs: ordered.        Disposition  Final diagnoses:   Exposure to blood or body fluid     Time reflects when diagnosis was documented in both MDM as applicable and the Disposition within this note       Time User Action Codes Description Comment    5/29/2025 11:30 AM Zeus Cedeno [Z77.21] Exposure to blood or body fluid           ED Disposition       ED Disposition   Discharge    Condition   Stable    Date/Time   Thu May 29, 2025 11:30 AM    Comment   Caprice Salomon discharge to home/self care.                   Follow-up Information       Follow up With Specialties Details Why Contact Info    [facility]  Schedule an appointment as soon as possible for a visit   [address] [place] [id number]  [phone]      [facility]  Schedule an appointment as soon as possible for a visit  As needed [address] [place] [id number]  [phone]            Discharge Medication List as of 5/29/2025 11:31 AM        CONTINUE these medications which have NOT CHANGED    Details   acetaminophen (TYLENOL) 500 mg tablet Take 2 tablets (1,000 mg total) by mouth every 8 (eight) hours, Starting Thu 8/24/2023, Normal      Magnesium Gluconate (MAGNESIUM 27 PO) Take by mouth, Historical Med      meloxicam (MOBIC) 7.5 mg tablet Take 7.5 mg by mouth, Starting Fri 11/3/2023, Historical Med      rimegepant sulfate (Nurtec) 75 mg TBDP Take 1 tablet (75 mg total) by mouth every other day, Starting Wed 5/21/2025, Normal      rizatriptan (MAXALT)  10 mg tablet Take 1 tablet (10 mg total) by mouth as needed for migraine May repeat once in 2 hours if needed.  Limit 3 a week or 12 a month, Starting Wed 1/29/2025, Normal             No discharge procedures on file.    PDMP Review         Value Time User    PDMP Reviewed  Yes 10/3/2023  2:37 PM Sriram Tesfaye PA-C              ED Provider  Electronically Signed by                 [1]   Past Medical History:  Diagnosis Date    GERD (gastroesophageal reflux disease)     Left knee pain     arthroscopy today 10/3/2023    Migraine     Migraines     Wears glasses    [2]   Family History  Problem Relation Name Age of Onset    Diabetes Father      Leukemia Brother      Diabetes Maternal Grandmother      Cancer Maternal Grandfather      Lung cancer Maternal Grandfather      Breast cancer Maternal Aunt     [3]   Past Surgical History:  Procedure Laterality Date    [place] LUMBAR PUNCTURE DIAGNOSTIC  9/27/2024    OVARIAN CYST REMOVAL      [place] ARTHROSCOPY KNEE REMOVAL LOOSE/FOREIGN BODY Left 10/3/2023    Procedure: ARTHROSCOPY KNEE, loose body removal;  Surgeon: Zeus Harrell MD;  Location: [facility];  Service: Orthopedics    TUBAL LIGATION     [4]   Social History  Tobacco Use    Smoking status: Never    Smokeless tobacco: Never   Vaping Use    Vaping status: Never Used   Substance Use Topics    Alcohol use: No    Drug use: No   [5]   Allergies  Allergen Reactions    Alum Sulfate-Ca Acetate Hives    Other Hives     Pickles    Pickled Meat     Pollen Extract Nasal Congestion    Shellfish Allergy - Food Allergy Hives     pickles    Betadine [Povidone Iodine] Hives and Rash    Latex Rash        Zeus Cedeno,   05/29/25 8495

## 2025-05-29 NOTE — DISCHARGE INSTRUCTIONS
Please follow up with [facility].     Return to the ED with any new/concerning issues.

## 2025-05-30 ENCOUNTER — APPOINTMENT (OUTPATIENT)
Dept: LAB | Facility: HOSPITAL | Age: 30
End: 2025-05-30

## 2025-05-30 LAB — HBV CORE AB SER QL: NORMAL

## 2025-06-02 ENCOUNTER — PATIENT MESSAGE (OUTPATIENT)
Age: 30
End: 2025-06-02

## 2025-06-03 ENCOUNTER — APPOINTMENT (OUTPATIENT)
Dept: URGENT CARE | Facility: CLINIC | Age: 30
End: 2025-06-03

## 2025-06-04 NOTE — PATIENT COMMUNICATION
Recd call from pt inquiring if FMLA were recd and if they can be filled. Reviewed message from provider with pt. Pt stated she is only per [employer] so she does not have enough hours for FMLA and requested 6 week continuous leave in order to see if the Nurtec is effective. Takes Nurtec HS every other day and takes only HS because it causes pt to have brain fog.  Denies any other side effects.    Advised pt to contact PCP to discuss FMLA.

## 2025-06-18 PROBLEM — E66.813 CLASS 3 SEVERE OBESITY DUE TO EXCESS CALORIES WITHOUT SERIOUS COMORBIDITY WITH BODY MASS INDEX (BMI) OF 40.0 TO 44.9 IN ADULT: Status: ACTIVE | Noted: 2024-10-23

## 2025-06-30 ENCOUNTER — TELEPHONE (OUTPATIENT)
Dept: NEUROLOGY | Facility: CLINIC | Age: 30
End: 2025-06-30

## 2025-07-09 ENCOUNTER — APPOINTMENT (OUTPATIENT)
Dept: LAB | Facility: HOSPITAL | Age: 30
End: 2025-07-09
Payer: COMMERCIAL

## 2025-07-09 DIAGNOSIS — G43.109 MIGRAINE WITH AURA AND WITHOUT STATUS MIGRAINOSUS, NOT INTRACTABLE: ICD-10-CM

## 2025-07-09 DIAGNOSIS — R73.03 PREDIABETES: ICD-10-CM

## 2025-07-09 LAB
ANION GAP SERPL CALCULATED.3IONS-SCNC: 6 MMOL/L (ref 4–13)
BUN SERPL-MCNC: 8 MG/DL (ref 5–25)
CALCIUM SERPL-MCNC: 8.9 MG/DL (ref 8.4–10.2)
CHLORIDE SERPL-SCNC: 109 MMOL/L (ref 96–108)
CHOLEST SERPL-MCNC: 131 MG/DL (ref ?–200)
CO2 SERPL-SCNC: 23 MMOL/L (ref 21–32)
CREAT SERPL-MCNC: 0.81 MG/DL (ref 0.6–1.3)
ERYTHROCYTE [DISTWIDTH] IN BLOOD BY AUTOMATED COUNT: 12.1 % (ref 11.6–15.1)
EST. AVERAGE GLUCOSE BLD GHB EST-MCNC: 117 MG/DL
GFR SERPL CREATININE-BSD FRML MDRD: 99 ML/MIN/1.73SQ M
GLUCOSE P FAST SERPL-MCNC: 103 MG/DL (ref 65–99)
HBA1C MFR BLD: 5.7 %
HCT VFR BLD AUTO: 38.2 % (ref 34.8–46.1)
HDLC SERPL-MCNC: 36 MG/DL
HGB BLD-MCNC: 12 G/DL (ref 11.5–15.4)
LDLC SERPL CALC-MCNC: 77 MG/DL (ref 0–100)
MCH RBC QN AUTO: 28.3 PG (ref 26.8–34.3)
MCHC RBC AUTO-ENTMCNC: 31.4 G/DL (ref 31.4–37.4)
MCV RBC AUTO: 90 FL (ref 82–98)
PLATELET # BLD AUTO: 319 THOUSANDS/UL (ref 149–390)
PMV BLD AUTO: 10.3 FL (ref 8.9–12.7)
POTASSIUM SERPL-SCNC: 3.8 MMOL/L (ref 3.5–5.3)
RBC # BLD AUTO: 4.24 MILLION/UL (ref 3.81–5.12)
SODIUM SERPL-SCNC: 138 MMOL/L (ref 135–147)
T4 FREE SERPL-MCNC: 0.78 NG/DL (ref 0.61–1.12)
TRIGL SERPL-MCNC: 91 MG/DL (ref ?–150)
TSH SERPL DL<=0.05 MIU/L-ACNC: 0.89 UIU/ML (ref 0.45–4.5)
WBC # BLD AUTO: 9.32 THOUSAND/UL (ref 4.31–10.16)

## 2025-07-09 PROCEDURE — 80048 BASIC METABOLIC PNL TOTAL CA: CPT

## 2025-07-09 PROCEDURE — 80061 LIPID PANEL: CPT

## 2025-07-09 PROCEDURE — 36415 COLL VENOUS BLD VENIPUNCTURE: CPT

## 2025-07-09 PROCEDURE — 83036 HEMOGLOBIN GLYCOSYLATED A1C: CPT

## 2025-07-09 PROCEDURE — 84443 ASSAY THYROID STIM HORMONE: CPT

## 2025-07-09 PROCEDURE — 84439 ASSAY OF FREE THYROXINE: CPT

## 2025-07-09 PROCEDURE — 85027 COMPLETE CBC AUTOMATED: CPT

## 2025-07-18 ENCOUNTER — HOSPITAL ENCOUNTER (EMERGENCY)
Facility: HOSPITAL | Age: 30
Discharge: HOME | End: 2025-07-18
Attending: EMERGENCY MEDICINE
Payer: COMMERCIAL

## 2025-07-18 VITALS
BODY MASS INDEX: 38.76 KG/M2 | HEIGHT: 64 IN | WEIGHT: 227 LBS | OXYGEN SATURATION: 100 % | HEART RATE: 87 BPM | DIASTOLIC BLOOD PRESSURE: 61 MMHG | TEMPERATURE: 97.8 F | SYSTOLIC BLOOD PRESSURE: 110 MMHG | RESPIRATION RATE: 18 BRPM

## 2025-07-18 DIAGNOSIS — G43.909 MIGRAINE: Primary | ICD-10-CM

## 2025-07-18 PROCEDURE — 96365 THER/PROPH/DIAG IV INF INIT: CPT

## 2025-07-18 PROCEDURE — 99284 EMERGENCY DEPT VISIT MOD MDM: CPT

## 2025-07-18 PROCEDURE — 99284 EMERGENCY DEPT VISIT MOD MDM: CPT | Performed by: EMERGENCY MEDICINE

## 2025-07-18 PROCEDURE — 96375 TX/PRO/DX INJ NEW DRUG ADDON: CPT

## 2025-07-18 RX ORDER — ACETAMINOPHEN 325 MG/1
650 TABLET ORAL ONCE
Status: COMPLETED | OUTPATIENT
Start: 2025-07-18 | End: 2025-07-18

## 2025-07-18 RX ORDER — KETOROLAC TROMETHAMINE 30 MG/ML
15 INJECTION, SOLUTION INTRAMUSCULAR; INTRAVENOUS ONCE
Status: COMPLETED | OUTPATIENT
Start: 2025-07-18 | End: 2025-07-18

## 2025-07-18 RX ORDER — METOCLOPRAMIDE HYDROCHLORIDE 5 MG/ML
10 INJECTION INTRAMUSCULAR; INTRAVENOUS ONCE
Status: DISCONTINUED | OUTPATIENT
Start: 2025-07-18 | End: 2025-07-18

## 2025-07-18 RX ORDER — DIPHENHYDRAMINE HYDROCHLORIDE 50 MG/ML
25 INJECTION, SOLUTION INTRAMUSCULAR; INTRAVENOUS ONCE
Status: DISCONTINUED | OUTPATIENT
Start: 2025-07-18 | End: 2025-07-18

## 2025-07-18 RX ADMIN — ACETAMINOPHEN 650 MG: 325 TABLET ORAL at 10:48

## 2025-07-18 RX ADMIN — CAFFEINE AND SODIUM BENZOATE 500 MG: 125 INJECTION, SOLUTION INTRAMUSCULAR; INTRAVENOUS at 11:28

## 2025-07-18 RX ADMIN — KETOROLAC TROMETHAMINE 15 MG: 30 INJECTION, SOLUTION INTRAMUSCULAR at 10:48

## 2025-07-18 NOTE — ED ATTENDING ATTESTATION
7/18/2025  IKarolyn MD, saw and evaluated the patient. I have discussed the patient with the resident/non-physician practitioner and agree with the resident's/non-physician practitioner's findings, Plan of Care, and MDM as documented in the resident's/non-physician practitioner's note, except where noted. All available labs and Radiology studies were reviewed.  I was present for key portions of any procedure(s) performed by the resident/non-physician practitioner and I was immediately available to provide assistance.       At this point I agree with the current assessment done in the Emergency Department.  I have conducted an independent evaluation of this patient a history and physical is as follows:    Patient presents with headache, has history of migraines, normal neuro exam, requesting IV caffeine. Normal neuro exam. Headache is not maximal in onset or worst of life. No neck pain/stiffness, no fevers, doubt SAH or meningitis.     General: VSS, NAD, awake, alert. Well-nourished, well-developed. Appears stated age.   Speaking normally in full sentences.   Head: Normocephalic, atraumatic, nontender.  Eyes: PERRL, EOM-I. No diplopia.   No hyphema.   No subconjunctival hemorrhages.  Symmetrical lids.   ENT: Atraumatic external nose and ears.    MMM  No malocclusion. No stridor. Normal phonation. No drooling. Normal swallowing.   Neck: Symmetric, trachea midline. No JVD.  CV: RRR. +S1/S2  No murmurs or gallops  Peripheral pulses +2 throughout. No chest wall tenderness.   Lungs:   Unlabored No retractions  CTAB, lungs sounds equal bilateral.   No tachypnea.   Abd: +BS, soft, NT/ND.   MSK:   FROM   Back:   No rashes  Skin: Dry, intact.   Neuro: AAOx3, GCS 15, CN II-XII grossly intact.   Motor grossly intact.  Psychiatric/Behavioral: Appropriate mood and affect   Exam: deferred     ED Course         Critical Care Time  Procedures

## 2025-07-18 NOTE — ED PROVIDER NOTES
ED Disposition       None          Assessment & Plan       Medical Decision Making  Patient presents with a headache was consistent with migraine.  No headache red flags.  Neurologic exam without evidence of nuchal rigidity, altered mental status, focal neurological findings; therefore, unlikely to be meningitis, encephalitis, or stroke.  Presentation not consistent with acute intracranial bleed including SAH, ICH.  No history of trauma.  No signs of increased intracranial pressure or weight loss to suggest brain mass.  Pain was controlled with ibuprofen, Tylenol, and caffeine.  Patient felt comfortable to be discharged from home.    Risk  OTC drugs.  Prescription drug management.             Medications - No data to display    ED Risk Strat Scores                    No data recorded                            History of Present Illness       Chief Complaint   Patient presents with    Headache - Recurrent or Known Dx Migraines     States she has hx of severe migraines, states she does get blurred vision with her migraines but she has been having blurry vision now  since Sunday. Able to follow commands NIH of 1 for sensation, states she feels weaker on L side.       Past Medical History[1]   Past Surgical History[2]   Family History[3]   Social History[4]   E-Cigarette/Vaping    E-Cigarette Use Never User       E-Cigarette/Vaping Substances    Nicotine No     THC No     CBD No     Flavoring No     Other No     Unknown No       I have reviewed and agree with the history as documented.     Caprice Salomon is a 29 year old female with past medical history of severe migraines with aura presenting with severe headache with vision changes, dizziness, facial numbness, and L sided arm weakness.  She states she does often have loss of vision with her auras but the dizziness, R facial numbness, and L sided arm weakness is new.  Patient recently had a severe migraine starting this Sunday around 5 days ago.  Patient took Nurtec  and headache improved.  However, around 1-2 days ago, patient developed severe right sided unilateral throbbing headache again with vision loss of the right side.  Patient took Nurtec, did not have any improvement.  She took her triptan for abortive therapy.  She states that the vision loss has improved, no only having blurry vision on the left side.  However, she does have a new onset right-sided facial numbness as well as left-sided weakness of her arm.  Otherwise, she denies any recent illness or infection.  No recent trauma.  No fevers or chills.      Headache - Recurrent or Known Dx Migraines  Associated symptoms: dizziness, numbness and weakness        Review of Systems   Neurological:  Positive for dizziness, weakness, numbness and headaches.           Objective       ED Triage Vitals [07/18/25 0959]   Temperature Pulse Blood Pressure Respirations SpO2 Patient Position - Orthostatic VS   97.8 °F (36.6 °C) 76 154/84 18 99 % --      Temp src Heart Rate Source BP Location FiO2 (%) Pain Score    -- -- -- -- --      Vitals      Date and Time Temp Pulse SpO2 Resp BP Pain Score FACES Pain Rating User   07/18/25 0959 97.8 °F (36.6 °C) 76 99 % 18 154/84 -- -- AM            Physical Exam  Constitutional:       General: She is not in acute distress.     Appearance: She is not ill-appearing.   HENT:      Head: Normocephalic and atraumatic.     Cardiovascular:      Rate and Rhythm: Normal rate and regular rhythm.      Pulses: Normal pulses.   Pulmonary:      Effort: Pulmonary effort is normal.   Abdominal:      General: Abdomen is flat.      Palpations: Abdomen is soft.     Musculoskeletal:      Cervical back: No rigidity.     Skin:     General: Skin is warm and dry.      Capillary Refill: Capillary refill takes less than 2 seconds.     Neurological:      General: No focal deficit present.      Mental Status: She is alert and oriented to person, place, and time.      Cranial Nerves: No cranial nerve deficit.      Sensory:  Sensory deficit (R sided facial numbness. Otherwise, sensation equal and intact throughout.) present.      Motor: No weakness (5/5 strength on all four extremities. Slightly stronger on R side than left.).      Gait: Gait normal.         Results Reviewed       None            No orders to display       Procedures    ED Medication and Procedure Management   Prior to Admission Medications   Prescriptions Last Dose Informant Patient Reported? Taking?   Magnesium Gluconate (MAGNESIUM 27 PO)  Self Yes No   Sig: Take by mouth   acetaminophen (TYLENOL) 500 mg tablet  Self No No   Sig: Take 2 tablets (1,000 mg total) by mouth every 8 (eight) hours   meloxicam (MOBIC) 7.5 mg tablet  Self Yes No   Sig: Take 7.5 mg by mouth   rimegepant sulfate (Nurtec) 75 mg TBDP   No No   Sig: Take 1 tablet (75 mg total) by mouth every other day   rizatriptan (MAXALT) 10 mg tablet  Self No No   Sig: Take 1 tablet (10 mg total) by mouth as needed for migraine May repeat once in 2 hours if needed.  Limit 3 a week or 12 a month      Facility-Administered Medications: None     Patient's Medications   Discharge Prescriptions    No medications on file     No discharge procedures on file.  ED SEPSIS DOCUMENTATION                [1]   Past Medical History:  Diagnosis Date    GERD (gastroesophageal reflux disease)     Left knee pain     arthroscopy today 10/3/2023    Migraine     Migraines     Wears glasses    [2]   Past Surgical History:  Procedure Laterality Date    [place] LUMBAR PUNCTURE DIAGNOSTIC  9/27/2024    OVARIAN CYST REMOVAL      [place] ARTHROSCOPY KNEE REMOVAL LOOSE/FOREIGN BODY Left 10/3/2023    Procedure: ARTHROSCOPY KNEE, loose body removal;  Surgeon: Zeus Harrell MD;  Location: [facility] OR;  Service: Orthopedics    TUBAL LIGATION     [3]   Family History  Problem Relation Name Age of Onset    Diabetes Father      Leukemia Brother      Diabetes Maternal Grandmother      Cancer Maternal Grandfather      Lung cancer Maternal Grandfather       Breast cancer Maternal Aunt     [4]   Social History  Tobacco Use    Smoking status: Never    Smokeless tobacco: Never   Vaping Use    Vaping status: Never Used   Substance Use Topics    Alcohol use: No    Drug use: No        Coby House PA-C  07/18/25 2031